# Patient Record
Sex: MALE | Race: WHITE | NOT HISPANIC OR LATINO | URBAN - METROPOLITAN AREA
[De-identification: names, ages, dates, MRNs, and addresses within clinical notes are randomized per-mention and may not be internally consistent; named-entity substitution may affect disease eponyms.]

---

## 2022-01-16 ENCOUNTER — EMERGENCY (EMERGENCY)
Facility: HOSPITAL | Age: 32
LOS: 1 days | Discharge: HOME | End: 2022-01-16
Attending: EMERGENCY MEDICINE | Admitting: EMERGENCY MEDICINE
Payer: MEDICARE

## 2022-01-16 VITALS
TEMPERATURE: 98 F | OXYGEN SATURATION: 96 % | DIASTOLIC BLOOD PRESSURE: 74 MMHG | HEART RATE: 90 BPM | SYSTOLIC BLOOD PRESSURE: 120 MMHG | RESPIRATION RATE: 18 BRPM

## 2022-01-16 VITALS
RESPIRATION RATE: 16 BRPM | HEART RATE: 84 BPM | DIASTOLIC BLOOD PRESSURE: 66 MMHG | SYSTOLIC BLOOD PRESSURE: 103 MMHG | OXYGEN SATURATION: 97 % | TEMPERATURE: 98 F

## 2022-01-16 DIAGNOSIS — U07.1 COVID-19: ICD-10-CM

## 2022-01-16 DIAGNOSIS — R05.9 COUGH, UNSPECIFIED: ICD-10-CM

## 2022-01-16 DIAGNOSIS — F17.200 NICOTINE DEPENDENCE, UNSPECIFIED, UNCOMPLICATED: ICD-10-CM

## 2022-01-16 LAB
ALBUMIN SERPL ELPH-MCNC: 4.7 G/DL — SIGNIFICANT CHANGE UP (ref 3.3–5)
ALP SERPL-CCNC: 136 U/L — HIGH (ref 40–120)
ALT FLD-CCNC: 23 U/L — SIGNIFICANT CHANGE UP (ref 10–45)
ANION GAP SERPL CALC-SCNC: 15 MMOL/L — SIGNIFICANT CHANGE UP (ref 5–17)
AST SERPL-CCNC: 30 U/L — SIGNIFICANT CHANGE UP (ref 10–40)
BASOPHILS # BLD AUTO: 0.04 K/UL — SIGNIFICANT CHANGE UP (ref 0–0.2)
BASOPHILS NFR BLD AUTO: 0.6 % — SIGNIFICANT CHANGE UP (ref 0–2)
BILIRUB SERPL-MCNC: 0.2 MG/DL — SIGNIFICANT CHANGE UP (ref 0.2–1.2)
BUN SERPL-MCNC: 15 MG/DL — SIGNIFICANT CHANGE UP (ref 7–23)
CALCIUM SERPL-MCNC: 8.6 MG/DL — SIGNIFICANT CHANGE UP (ref 8.4–10.5)
CHLORIDE SERPL-SCNC: 103 MMOL/L — SIGNIFICANT CHANGE UP (ref 96–108)
CO2 SERPL-SCNC: 23 MMOL/L — SIGNIFICANT CHANGE UP (ref 22–31)
CREAT SERPL-MCNC: 0.68 MG/DL — SIGNIFICANT CHANGE UP (ref 0.5–1.3)
EOSINOPHIL # BLD AUTO: 0.11 K/UL — SIGNIFICANT CHANGE UP (ref 0–0.5)
EOSINOPHIL NFR BLD AUTO: 1.5 % — SIGNIFICANT CHANGE UP (ref 0–6)
ETHANOL SERPL-MCNC: 242 MG/DL — HIGH (ref 0–10)
GLUCOSE SERPL-MCNC: 85 MG/DL — SIGNIFICANT CHANGE UP (ref 70–99)
HCT VFR BLD CALC: 33.2 % — LOW (ref 39–50)
HGB BLD-MCNC: 11.4 G/DL — LOW (ref 13–17)
IMM GRANULOCYTES NFR BLD AUTO: 0.4 % — SIGNIFICANT CHANGE UP (ref 0–1.5)
LIDOCAIN IGE QN: 25 U/L — SIGNIFICANT CHANGE UP (ref 7–60)
LYMPHOCYTES # BLD AUTO: 2.51 K/UL — SIGNIFICANT CHANGE UP (ref 1–3.3)
LYMPHOCYTES # BLD AUTO: 34.9 % — SIGNIFICANT CHANGE UP (ref 13–44)
MCHC RBC-ENTMCNC: 29.5 PG — SIGNIFICANT CHANGE UP (ref 27–34)
MCHC RBC-ENTMCNC: 34.3 GM/DL — SIGNIFICANT CHANGE UP (ref 32–36)
MCV RBC AUTO: 85.8 FL — SIGNIFICANT CHANGE UP (ref 80–100)
MONOCYTES # BLD AUTO: 0.77 K/UL — SIGNIFICANT CHANGE UP (ref 0–0.9)
MONOCYTES NFR BLD AUTO: 10.7 % — SIGNIFICANT CHANGE UP (ref 2–14)
NEUTROPHILS # BLD AUTO: 3.74 K/UL — SIGNIFICANT CHANGE UP (ref 1.8–7.4)
NEUTROPHILS NFR BLD AUTO: 51.9 % — SIGNIFICANT CHANGE UP (ref 43–77)
NRBC # BLD: 0 /100 WBCS — SIGNIFICANT CHANGE UP (ref 0–0)
PLATELET # BLD AUTO: 398 K/UL — SIGNIFICANT CHANGE UP (ref 150–400)
POTASSIUM SERPL-MCNC: 4 MMOL/L — SIGNIFICANT CHANGE UP (ref 3.5–5.3)
POTASSIUM SERPL-SCNC: 4 MMOL/L — SIGNIFICANT CHANGE UP (ref 3.5–5.3)
PROT SERPL-MCNC: 8 G/DL — SIGNIFICANT CHANGE UP (ref 6–8.3)
RBC # BLD: 3.87 M/UL — LOW (ref 4.2–5.8)
RBC # FLD: 14.8 % — HIGH (ref 10.3–14.5)
SARS-COV-2 RNA SPEC QL NAA+PROBE: NEGATIVE — SIGNIFICANT CHANGE UP
SODIUM SERPL-SCNC: 141 MMOL/L — SIGNIFICANT CHANGE UP (ref 135–145)
WBC # BLD: 7.2 K/UL — SIGNIFICANT CHANGE UP (ref 3.8–10.5)
WBC # FLD AUTO: 7.2 K/UL — SIGNIFICANT CHANGE UP (ref 3.8–10.5)

## 2022-01-16 PROCEDURE — 96374 THER/PROPH/DIAG INJ IV PUSH: CPT

## 2022-01-16 PROCEDURE — 87635 SARS-COV-2 COVID-19 AMP PRB: CPT

## 2022-01-16 PROCEDURE — 71045 X-RAY EXAM CHEST 1 VIEW: CPT | Mod: 26

## 2022-01-16 PROCEDURE — 99053 MED SERV 10PM-8AM 24 HR FAC: CPT

## 2022-01-16 PROCEDURE — 85025 COMPLETE CBC W/AUTO DIFF WBC: CPT

## 2022-01-16 PROCEDURE — 36415 COLL VENOUS BLD VENIPUNCTURE: CPT

## 2022-01-16 PROCEDURE — 96375 TX/PRO/DX INJ NEW DRUG ADDON: CPT

## 2022-01-16 PROCEDURE — 99284 EMERGENCY DEPT VISIT MOD MDM: CPT

## 2022-01-16 PROCEDURE — 99284 EMERGENCY DEPT VISIT MOD MDM: CPT | Mod: 25

## 2022-01-16 PROCEDURE — 83690 ASSAY OF LIPASE: CPT

## 2022-01-16 PROCEDURE — 80307 DRUG TEST PRSMV CHEM ANLYZR: CPT

## 2022-01-16 PROCEDURE — 71045 X-RAY EXAM CHEST 1 VIEW: CPT

## 2022-01-16 PROCEDURE — 80053 COMPREHEN METABOLIC PANEL: CPT

## 2022-01-16 RX ORDER — GABAPENTIN 400 MG/1
0 CAPSULE ORAL
Qty: 0 | Refills: 0 | DISCHARGE

## 2022-01-16 RX ORDER — ESCITALOPRAM OXALATE 10 MG/1
0 TABLET, FILM COATED ORAL
Qty: 0 | Refills: 0 | DISCHARGE

## 2022-01-16 RX ORDER — ONDANSETRON 8 MG/1
4 TABLET, FILM COATED ORAL ONCE
Refills: 0 | Status: COMPLETED | OUTPATIENT
Start: 2022-01-16 | End: 2022-01-16

## 2022-01-16 RX ORDER — FAMOTIDINE 10 MG/ML
20 INJECTION INTRAVENOUS ONCE
Refills: 0 | Status: COMPLETED | OUTPATIENT
Start: 2022-01-16 | End: 2022-01-16

## 2022-01-16 RX ADMIN — FAMOTIDINE 20 MILLIGRAM(S): 10 INJECTION INTRAVENOUS at 01:53

## 2022-01-16 RX ADMIN — ONDANSETRON 4 MILLIGRAM(S): 8 TABLET, FILM COATED ORAL at 01:54

## 2022-01-16 NOTE — ED PROVIDER NOTE - CLINICAL SUMMARY MEDICAL DECISION MAKING FREE TEXT BOX
30 y/o m presents covid + with upper abd pain, nausea; pt afebrile, vss, exam unremarkable.  Will check basic labs, cxr, given IV fluid, zofran/pepcid.  F/u results and reassess.

## 2022-01-16 NOTE — ED PROVIDER NOTE - ATTENDING CONTRIBUTION TO CARE
Pt w/ PMHx TBI, is unvaccinated, reported COVID+, with sx for 7 days which have included cough, body aches, abd pain, vomiting. Pt stating his abdomen has been hurting today although he hasn't vomited in the past day. He states he has since tolerated a pint of alcohol, s/p last vomiting yesterday. Denies SOB, CP, diarrhea, all other ROS negative. He denies drug use.   Limited PE performed in the setting of the COVID10 pandemic, in efforts to limit exposure and cross-contamination  Constitutional: Well appearing, awake, alert, oriented to person, place, time/situation and in no apparent distress.  ENMT: Airway patent.   Eyes: Clear bilaterally  Cardiac: Normal rate, regular rhythm.   Respiratory: No increased WOB, tachypnea, hypoxia, or accessory mm use. Pt speaks in full sentences.   Gastrointestinal: Abd soft, NT, ND. No guarding, rebound, or rigidity. No pulsatile abd masses.   Musculoskeletal: Range of motion is not limited  Neuro: Alert and oriented x 3, face symmetric and speech fluent. Nml gross motor movement, grossly non focal   Skin: Skin normal color for race, warm, dry and intact. No evidence of rash.  Psych: Alert and oriented to person, place, time/situation. flat affect. no apparent risk to self or others.   Pt p/w not feeling well, reports COVID+ from outside facility, c/o abd pain w/o tenderness, vomiting which has ceased, s/p alcohol intake. Clinically hydrated. Benign, non tender abd. No CP, SOB, inc WOB, tachypnea or hypoxia in the setting of COVID19. Suspect intoxicated. Gentle hydration, check labs to eval for electrolyte / metabolic disturbances, pancreatitis (although low suspicion).   Labs w/o sig abnormalities. CXR clear. observed for sobriety. D/c w/ supportive care / iso instructions

## 2022-01-16 NOTE — ED PROVIDER NOTE - OBJECTIVE STATEMENT
30 y/o m presents covid + with sx for 7 days which have included cough, body aches, abd pain, vomiting.  Pt stating his abdomen has been hurting today although he hasn't vomited in the past day.  Denies SOB, CP, diarrhea, all other ROS negative.

## 2022-01-16 NOTE — ED ADULT TRIAGE NOTE - DOMESTIC TRAVEL HIGH RISK QUESTION
Patient has called back stating he is still having lots os swelling and pain in his legs and feet and was wanting to know what he could do.     Please advise   
Patient was notified with verbal understanding, and to let us know if he continued to have any issues and to keep follow up apointment  
Patient would like to know the results of his recent labs   
No

## 2022-01-16 NOTE — ED PROVIDER NOTE - NSFOLLOWUPINSTRUCTIONS_ED_ALL_ED_FT
COVID-19 (Coronavirus Disease 2019)    WHAT YOU NEED TO KNOW:    COVID-19 is the disease caused by a coronavirus first discovered in December 2019. Coronaviruses generally cause upper respiratory (nose, throat, and lung) infections, such as a cold. The 2019 virus spreads quickly and easily. It can be spread starting 2 to 3 days before symptoms even begin.    DISCHARGE INSTRUCTIONS:    Call your local emergency number (911 in the ) if:   •You have trouble breathing or shortness of breath at rest.      •You have chest pain or pressure that lasts longer than 5 minutes.      •You become confused or hard to wake.      •Your lips or face are blue.      Return to the emergency department if:   •You have a fever of 104°F (40°C) or higher.          Call your doctor if:   •You have symptoms of COVID-19.      •You have questions or concerns about your condition or care.      Medicines: You may need any of the following:   •Decongestants help reduce nasal congestion and help you breathe more easily. If you take decongestant pills, they may make you feel restless or cause problems with your sleep. Do not use decongestant sprays for more than a few days.      •Cough suppressants help reduce coughing. Ask your healthcare provider which type of cough medicine is best for you.      •Acetaminophen decreases pain and fever. It is available without a doctor's order. Ask how much to take and how often to take it. Follow directions. Read the labels of all other medicines you are using to see if they also contain acetaminophen, or ask your doctor or pharmacist. Acetaminophen can cause liver damage if not taken correctly. Do not use more than 4 grams (4,000 milligrams) total of acetaminophen in one day.       •NSAIDs, such as ibuprofen, help decrease swelling, pain, and fever. This medicine is available with or without a doctor's order. NSAIDs can cause stomach bleeding or kidney problems in certain people. If you take blood thinner medicine, always ask your healthcare provider if NSAIDs are safe for you. Always read the medicine label and follow directions.      •Blood thinners help prevent blood clots. Clots can cause strokes, heart attacks, and death. The following are general safety guidelines to follow while you are taking a blood thinner:?Watch for bleeding and bruising while you take blood thinners. Watch for bleeding from your gums or nose. Watch for blood in your urine and bowel movements. Use a soft washcloth on your skin, and a soft toothbrush to brush your teeth. This can keep your skin and gums from bleeding. If you shave, use an electric shaver. Do not play contact sports.       ?Tell your dentist and other healthcare providers that you take a blood thinner. Wear a bracelet or necklace that says you take this medicine.       ?Do not start or stop any other medicines unless your healthcare provider tells you to. Many medicines cannot be used with blood thinners.      ?Take your blood thinner exactly as prescribed by your healthcare provider. Do not skip does or take less than prescribed. Tell your provider right away if you forget to take your blood thinner, or if you take too much.      ?Warfarin is a blood thinner that you may need to take. The following are things you should be aware of if you take warfarin: ?Foods and medicines can affect the amount of warfarin in your blood. Do not make major changes to your diet while you take warfarin. Warfarin works best when you eat about the same amount of vitamin K every day. Vitamin K is found in green leafy vegetables and certain other foods. Ask for more information about what to eat when you are taking warfarin.      ?You will need to see your healthcare provider for follow-up visits when you are on warfarin. You will need regular blood tests. These tests are used to decide how much medicine you need.         •Take your medicine as directed. Contact your healthcare provider if you think your medicine is not helping or if you have side effects. Tell him or her if you are allergic to any medicine. Keep a list of the medicines, vitamins, and herbs you take. Include the amounts, and when and why you take them. Bring the list or the pill bottles to follow-up visits. Carry your medicine list with you in case of an emergency.      What you need to know about variants: The virus has changed into several new forms (called variants) since it was discovered. The variants may be more contagious (easily spread) than the original form. Some may also cause more severe illness than others.    What you need to know about COVID-19 vaccines: Healthcare providers recommend a COVID-19 vaccine, even if you have already had COVID-19. You are considered fully vaccinated against COVID-19 two weeks after the final dose of any COVID-19 vaccine. Let your healthcare provider know when you have received the final dose of the vaccine. Make a copy of your vaccination card. Keep the original with you in case you need to show it. Keep the copy in a safe place.  •COVID-19 vaccines are given as a shot in 1 or 2 doses.   Vaccination is recommended for everyone 5 years or older. One 2-dose vaccine is fully approved for those 16 or older. This vaccine also has an emergency use authorization (EUA) for children 5 to 15 years old. No vaccine is currently available for children younger than 5 years. An additional (booster) dose is also recommended for all adults 18 or older. The booster can be a different brand of the COVID-19 vaccine than you originally received. The timing for the booster depends on the type of vaccine you received:?1-dose vaccine: The booster is given at least 2 months after you received the vaccine.      ?2-dose vaccine: The booster is given at least 6 months after the second dose.    COVID-19 Immunization Schedule         •Continue social distancing and other measures, even after you get the vaccine. Although it is not common, you can become infected after you get the vaccine. You may also be able to pass the virus to others without knowing you are infected.      •After you get the vaccine, check local, national, and international travel rules. You may need to be tested before you travel. Some countries require proof of a negative test before you travel. You may also need to quarantine after you return.      How the 2019 coronavirus spreads:   •Droplets are the main way all coronaviruses spread. The virus travels in droplets that form when a person talks, sings, coughs, or sneezes. The droplets can also float in the air for minutes or hours. Infection happens when you breathe in the droplets or get them in your eyes or nose. Close personal contact with an infected person increases your risk for infection. This means being within 6 feet (2 meters) of the person for at least 15 minutes over 24 hours.      •Person-to-person contact can spread the virus. For example, a person with the virus on his or her hands can spread it by shaking hands with someone.      •The virus can stay on objects and surfaces for up to 3 days. You may become infected by touching the object or surface and then touching your eyes or mouth.      Help lower the risk for COVID-19:   •Wash your hands often throughout the day. Use soap and water. Rub your soapy hands together, lacing your fingers, for at least 20 seconds. Rinse with warm, running water. Dry your hands with a clean towel or paper towel. Use hand  that contains alcohol if soap and water are not available. Teach children how to wash their hands and use hand .  Handwashing           •Cover sneezes and coughs. Turn your face away and cover your mouth and nose with a tissue. Throw the tissue away. Use the bend of your arm if a tissue is not available. Then wash your hands well with soap and water or use hand . Teach children how to cover a cough or sneeze.      •Wear a face covering (mask) when needed. Use a cloth covering with at least 2 layers. You can also create layers by putting a cloth covering over a disposable non-medical mask. Cover your mouth and your nose.  How to Wear a Face Covering (Mask)           •Follow worldwide, national, and local social distancing guidelines. Keep at least 6 feet (2 meters) between you and others.      •Try not to touch your face. If you get the virus on your hands, you can transfer it to your eyes, nose, or mouth and become infected. You can also transfer it to objects, surfaces, or people.      •Clean and disinfect high-touch surfaces and objects often. Use disinfecting wipes, or make a solution of 4 teaspoons of bleach in 1 quart (4 cups) of water.      •Ask about other vaccines you may need. Get the influenza (flu) vaccine as soon as recommended each year, usually starting in September or October. Get the pneumonia vaccine if recommended. Your healthcare provider can tell you if you should also get other vaccines, and when to get them.    Prevent COVID-19 Infection         Follow social distancing guidelines: National and local social distancing rules vary. Rules and restrictions may change over time as restrictions are lifted. The following are general guidelines:   •Stay home if you are sick or think you may have COVID-19. It is important to stay home if you are waiting for a testing appointment or for test results.      •Avoid close physical contact with anyone who does not live in your home. Do not shake hands with, hug, or kiss a person as a greeting. If you must use public transportation (such as a bus or subway), try to sit or stand away from others. Wear your face covering.      •Avoid in-person gatherings and crowds. Attend virtually if possible.      Follow up with your doctor as directed: Write down your questions so you remember to ask them during your visits.    For more information:   •Centers for Disease Control and Prevention  [address] [place] [address]  Phone: 1-748.119.1967  Web Address: http://www.cdc.gov

## 2022-01-16 NOTE — ED ADULT NURSE NOTE - OBJECTIVE STATEMENT
Pt presented to the ED with complaints of abdominal pain. As per pt, he has been feeling unwell for the past week, states that he tested positive for covid 2 days ago. Pt stated he has been having abdominal pain, nausea, vomiting, and cough.

## 2022-01-16 NOTE — ED ADULT NURSE NOTE - NSIMPLEMENTINTERV_GEN_ALL_ED
Implemented All Universal Safety Interventions:  [place] to call system. Call bell, personal items and telephone within reach. Instruct patient to call for assistance. Room bathroom lighting operational. Non-slip footwear when patient is off stretcher. Physically safe environment: no spills, clutter or unnecessary equipment. Stretcher in lowest position, wheels locked, appropriate side rails in place.

## 2022-01-16 NOTE — ED PROVIDER NOTE - PATIENT PORTAL LINK FT
You can access the FollowMyHealth Patient Portal offered by [facility] by registering at the following website: [email or web address]. By joining [employer]’s FollowMyHealth portal, you will also be able to view your health information using other applications (apps) compatible with our system.

## 2023-01-02 ENCOUNTER — EMERGENCY (EMERGENCY)
Facility: HOSPITAL | Age: 33
LOS: 1 days | Discharge: ANOTHER HOSPITAL | End: 2023-01-02
Attending: EMERGENCY MEDICINE | Admitting: EMERGENCY MEDICINE
Payer: MEDICAID

## 2023-01-02 VITALS
SYSTOLIC BLOOD PRESSURE: 138 MMHG | OXYGEN SATURATION: 94 % | HEIGHT: 67 IN | RESPIRATION RATE: 18 BRPM | HEART RATE: 121 BPM | DIASTOLIC BLOOD PRESSURE: 84 MMHG | WEIGHT: 130.07 LBS | TEMPERATURE: 98 F

## 2023-01-02 LAB
ALBUMIN SERPL ELPH-MCNC: 4.1 G/DL — SIGNIFICANT CHANGE UP (ref 3.4–5)
ALP SERPL-CCNC: 146 U/L — HIGH (ref 40–120)
ALT FLD-CCNC: 201 U/L — HIGH (ref 12–42)
ANION GAP SERPL CALC-SCNC: 14 MMOL/L — SIGNIFICANT CHANGE UP (ref 9–16)
AST SERPL-CCNC: 244 U/L — HIGH (ref 15–37)
BASOPHILS # BLD AUTO: 0.04 K/UL — SIGNIFICANT CHANGE UP (ref 0–0.2)
BASOPHILS NFR BLD AUTO: 0.9 % — SIGNIFICANT CHANGE UP (ref 0–2)
BILIRUB SERPL-MCNC: 0.4 MG/DL — SIGNIFICANT CHANGE UP (ref 0.2–1.2)
BUN SERPL-MCNC: 7 MG/DL — SIGNIFICANT CHANGE UP (ref 7–23)
CALCIUM SERPL-MCNC: 9.8 MG/DL — SIGNIFICANT CHANGE UP (ref 8.5–10.5)
CHLORIDE SERPL-SCNC: 98 MMOL/L — SIGNIFICANT CHANGE UP (ref 96–108)
CO2 SERPL-SCNC: 26 MMOL/L — SIGNIFICANT CHANGE UP (ref 22–31)
CREAT SERPL-MCNC: 0.76 MG/DL — SIGNIFICANT CHANGE UP (ref 0.5–1.3)
EGFR: 121 ML/MIN/1.73M2 — SIGNIFICANT CHANGE UP
EOSINOPHIL # BLD AUTO: 0.02 K/UL — SIGNIFICANT CHANGE UP (ref 0–0.5)
EOSINOPHIL NFR BLD AUTO: 0.5 % — SIGNIFICANT CHANGE UP (ref 0–6)
ETHANOL SERPL-MCNC: 264 MG/DL — HIGH
GLUCOSE SERPL-MCNC: 85 MG/DL — SIGNIFICANT CHANGE UP (ref 70–99)
HCT VFR BLD CALC: 40.6 % — SIGNIFICANT CHANGE UP (ref 39–50)
HGB BLD-MCNC: 14.1 G/DL — SIGNIFICANT CHANGE UP (ref 13–17)
IMM GRANULOCYTES NFR BLD AUTO: 0.5 % — SIGNIFICANT CHANGE UP (ref 0–0.9)
LYMPHOCYTES # BLD AUTO: 0.8 K/UL — LOW (ref 1–3.3)
LYMPHOCYTES # BLD AUTO: 18.9 % — SIGNIFICANT CHANGE UP (ref 13–44)
MCHC RBC-ENTMCNC: 29.9 PG — SIGNIFICANT CHANGE UP (ref 27–34)
MCHC RBC-ENTMCNC: 34.7 GM/DL — SIGNIFICANT CHANGE UP (ref 32–36)
MCV RBC AUTO: 86 FL — SIGNIFICANT CHANGE UP (ref 80–100)
MONOCYTES # BLD AUTO: 0.22 K/UL — SIGNIFICANT CHANGE UP (ref 0–0.9)
MONOCYTES NFR BLD AUTO: 5.2 % — SIGNIFICANT CHANGE UP (ref 2–14)
NEUTROPHILS # BLD AUTO: 3.14 K/UL — SIGNIFICANT CHANGE UP (ref 1.8–7.4)
NEUTROPHILS NFR BLD AUTO: 74 % — SIGNIFICANT CHANGE UP (ref 43–77)
NRBC # BLD: 0 /100 WBCS — SIGNIFICANT CHANGE UP (ref 0–0)
PLATELET # BLD AUTO: 140 K/UL — LOW (ref 150–400)
POTASSIUM SERPL-MCNC: 3.3 MMOL/L — LOW (ref 3.5–5.3)
POTASSIUM SERPL-SCNC: 3.3 MMOL/L — LOW (ref 3.5–5.3)
PROT SERPL-MCNC: 8.1 G/DL — SIGNIFICANT CHANGE UP (ref 6.4–8.2)
RBC # BLD: 4.72 M/UL — SIGNIFICANT CHANGE UP (ref 4.2–5.8)
RBC # FLD: 14.2 % — SIGNIFICANT CHANGE UP (ref 10.3–14.5)
SODIUM SERPL-SCNC: 138 MMOL/L — SIGNIFICANT CHANGE UP (ref 132–145)
WBC # BLD: 4.24 K/UL — SIGNIFICANT CHANGE UP (ref 3.8–10.5)
WBC # FLD AUTO: 4.24 K/UL — SIGNIFICANT CHANGE UP (ref 3.8–10.5)

## 2023-01-02 PROCEDURE — 99285 EMERGENCY DEPT VISIT HI MDM: CPT

## 2023-01-02 PROCEDURE — 70486 CT MAXILLOFACIAL W/O DYE: CPT | Mod: 26

## 2023-01-02 PROCEDURE — 70450 CT HEAD/BRAIN W/O DYE: CPT | Mod: 26

## 2023-01-02 RX ORDER — SODIUM CHLORIDE 9 MG/ML
1000 INJECTION INTRAMUSCULAR; INTRAVENOUS; SUBCUTANEOUS ONCE
Refills: 0 | Status: COMPLETED | OUTPATIENT
Start: 2023-01-02 | End: 2023-01-02

## 2023-01-02 RX ORDER — ACETAMINOPHEN 500 MG
975 TABLET ORAL ONCE
Refills: 0 | Status: COMPLETED | OUTPATIENT
Start: 2023-01-02 | End: 2023-01-02

## 2023-01-02 RX ADMIN — SODIUM CHLORIDE 1000 MILLILITER(S): 9 INJECTION INTRAMUSCULAR; INTRAVENOUS; SUBCUTANEOUS at 22:16

## 2023-01-02 RX ADMIN — Medication 975 MILLIGRAM(S): at 23:01

## 2023-01-02 RX ADMIN — SODIUM CHLORIDE 1000 MILLILITER(S): 9 INJECTION INTRAMUSCULAR; INTRAVENOUS; SUBCUTANEOUS at 23:01

## 2023-01-02 NOTE — ED PROVIDER NOTE - PHYSICAL EXAMINATION
VSS in NAD (+) alcohol intox   (+) R facial swelling, dried/healing facial abrasions, (+) Swelling over R maxillary sinus (+) R subconjunctival hemorrhage EOMI PERRL OP clear  heart RRR no murmur   lungs CTA no wheezing no rales no rhonchi   L lateral chest wall with linear abrasions, no chest wall tenderness   abd soft NT ND no CVAT no guarding no rebound   normal neuro exam CN I-XII grossly intact, no groos motor or sensory deficits  no peripheral c/c/e  moving all extremities no obvious extremity trauma

## 2023-01-02 NOTE — ED PROVIDER NOTE - CARE PLAN
Principal Discharge DX:	Intraparenchymal hemorrhage of brain  Secondary Diagnosis:	Subdural hemorrhage  Secondary Diagnosis:	Nasal bone fracture  Secondary Diagnosis:	Alcohol intoxication   1
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
## 2023-01-02 NOTE — ED ADULT TRIAGE NOTE - AS TEMP SITE
Follow-up Note  CC:   HISTORY OF PRESENT ILLNESS:  Byron Floyd is a 44year old old male, originally referred to the pain clinic by Dr. Thu Gonzalez, for lower back pain and left leg pain status post a left-sided L4-5 transforaminal epidural steroid injecti oral pain.  Numbness/tingling: as above  Weakness: as above  Weight Loss: Negative   Fever: Negative   Cardiovascular:  No current chest pain or palpitations   Respiratory:  No current shortness of breath   Gastrointestinal:  No active ulcer  Genitourinary:  Ne activity: Not on file    Lifestyle      Physical activity:        Days per week: Not on file        Minutes per session: Not on file      Stress: Not on file    Relationships      Social connections:        Talks on phone: Not on file        Gets together: loading in the lumbar spine    MOTOR EXAMINATION:  LOWER EXTREMITY      LEFT RIGHT   Iliopsoas 5/5 5/5   Quadriceps 5/5 5/5   Foot DF 5/5 5/5   Foot EHL 5/5 5/5   Gastrocnemius 5/5 5/5   Edema - Absent  Skin - normal   Sensation-intact symmetric to light t =====  CONCLUSION:      1. L4-L5 left foraminal zone disc protrusion, which results in moderate to severe left neural foraminal stenosis and effacement of the exiting left L4 nerve root. Please correlate for left L4 radiculopathy.   2. L3-L4:  Mild left inflammatories, muscle relaxants, neuropathic medications, oral steroids, analgesics), injections, and further testing. Risks and benefits of all options were discussed at length to patients satisfaction during a comprehensive interactive discussion.  All q
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
## 2023-01-03 VITALS
SYSTOLIC BLOOD PRESSURE: 109 MMHG | DIASTOLIC BLOOD PRESSURE: 62 MMHG | TEMPERATURE: 97 F | RESPIRATION RATE: 18 BRPM | OXYGEN SATURATION: 99 % | HEART RATE: 98 BPM

## 2023-01-03 LAB — SARS-COV-2 RNA SPEC QL NAA+PROBE: SIGNIFICANT CHANGE UP

## 2023-01-03 NOTE — ED ADULT NURSE REASSESSMENT NOTE - GENERAL PATIENT STATE
Detail Level: Detailed
Add 98953 Cpt? (Important Note: In 2017 The Use Of 00965 Is Being Tracked By Cms To Determine Future Global Period Reimbursement For Global Periods): no
resting/sleeping

## 2023-01-03 NOTE — ED ADULT NURSE REASSESSMENT NOTE - NS ED NURSE REASSESS COMMENT FT1
Patient pulled his IV out, states that it is "unnecessary." Patient made aware that he is awaiting CTr.
Patient continues to be resting on the stretcher, placed another PIV, made aware of the need for IV and instructed not to pull it out again, verbalized understanding. Patient's vitals as charted. Denies acute pain, denies nausea/vomiting.
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
## 2023-01-05 DIAGNOSIS — S02.2XXA FRACTURE OF NASAL BONES, INITIAL ENCOUNTER FOR CLOSED FRACTURE: ICD-10-CM

## 2023-01-05 DIAGNOSIS — S06.360A TRAUMATIC HEMORRHAGE OF CEREBRUM, UNSPECIFIED, WITHOUT LOSS OF CONSCIOUSNESS, INITIAL ENCOUNTER: ICD-10-CM

## 2023-01-05 DIAGNOSIS — S09.90XA UNSPECIFIED INJURY OF HEAD, INITIAL ENCOUNTER: ICD-10-CM
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
## 2023-01-05 DIAGNOSIS — S06.5X0A TRAUMATIC SUBDURAL HEMORRHAGE WITHOUT LOSS OF CONSCIOUSNESS, INITIAL ENCOUNTER: ICD-10-CM

## 2023-01-05 DIAGNOSIS — F10.129 ALCOHOL ABUSE WITH INTOXICATION, UNSPECIFIED: ICD-10-CM

## 2023-04-09 ENCOUNTER — EMERGENCY (EMERGENCY)
Facility: HOSPITAL | Age: 33
LOS: 1 days | Discharge: HOME | End: 2023-04-09
Attending: EMERGENCY MEDICINE | Admitting: EMERGENCY MEDICINE
Payer: MEDICAID

## 2023-04-09 VITALS
OXYGEN SATURATION: 99 % | DIASTOLIC BLOOD PRESSURE: 92 MMHG | HEART RATE: 83 BPM | RESPIRATION RATE: 16 BRPM | TEMPERATURE: 97 F | SYSTOLIC BLOOD PRESSURE: 129 MMHG

## 2023-04-09 LAB
ALBUMIN SERPL ELPH-MCNC: 4.9 G/DL — SIGNIFICANT CHANGE UP (ref 3.3–5)
ALP SERPL-CCNC: 133 U/L — HIGH (ref 40–120)
ALT FLD-CCNC: 60 U/L — HIGH (ref 4–41)
AMPHET UR-MCNC: NEGATIVE — SIGNIFICANT CHANGE UP
ANION GAP SERPL CALC-SCNC: 19 MMOL/L — HIGH (ref 7–14)
APAP SERPL-MCNC: <10 UG/ML — LOW (ref 15–25)
APPEARANCE UR: CLEAR — SIGNIFICANT CHANGE UP
AST SERPL-CCNC: 148 U/L — HIGH (ref 4–40)
BACTERIA # UR AUTO: NEGATIVE — SIGNIFICANT CHANGE UP
BARBITURATES UR SCN-MCNC: NEGATIVE — SIGNIFICANT CHANGE UP
BASE EXCESS BLDV CALC-SCNC: 2.9 MMOL/L — SIGNIFICANT CHANGE UP (ref -2–3)
BASE EXCESS BLDV CALC-SCNC: 4.6 MMOL/L — HIGH (ref -2–3)
BASOPHILS # BLD AUTO: 0.1 K/UL — SIGNIFICANT CHANGE UP (ref 0–0.2)
BASOPHILS NFR BLD AUTO: 3.5 % — HIGH (ref 0–2)
BENZODIAZ UR-MCNC: POSITIVE
BILIRUB SERPL-MCNC: 0.3 MG/DL — SIGNIFICANT CHANGE UP (ref 0.2–1.2)
BILIRUB UR-MCNC: NEGATIVE — SIGNIFICANT CHANGE UP
BLOOD GAS VENOUS COMPREHENSIVE RESULT: SIGNIFICANT CHANGE UP
BUN SERPL-MCNC: 5 MG/DL — LOW (ref 7–23)
CALCIUM SERPL-MCNC: 9 MG/DL — SIGNIFICANT CHANGE UP (ref 8.4–10.5)
CHLORIDE BLDV-SCNC: 106 MMOL/L — SIGNIFICANT CHANGE UP (ref 96–108)
CHLORIDE BLDV-SCNC: 108 MMOL/L — SIGNIFICANT CHANGE UP (ref 96–108)
CHLORIDE SERPL-SCNC: 102 MMOL/L — SIGNIFICANT CHANGE UP (ref 98–107)
CO2 BLDV-SCNC: 30.4 MMOL/L — HIGH (ref 22–26)
CO2 BLDV-SCNC: 32.9 MMOL/L — HIGH (ref 22–26)
CO2 SERPL-SCNC: 24 MMOL/L — SIGNIFICANT CHANGE UP (ref 22–31)
COCAINE METAB.OTHER UR-MCNC: NEGATIVE — SIGNIFICANT CHANGE UP
COLOR SPEC: COLORLESS — SIGNIFICANT CHANGE UP
CREAT SERPL-MCNC: 0.55 MG/DL — SIGNIFICANT CHANGE UP (ref 0.5–1.3)
CREATININE URINE RESULT, DAU: 16 MG/DL — SIGNIFICANT CHANGE UP
DIFF PNL FLD: NEGATIVE — SIGNIFICANT CHANGE UP
EGFR: 133 ML/MIN/1.73M2 — SIGNIFICANT CHANGE UP
EOSINOPHIL # BLD AUTO: 0.03 K/UL — SIGNIFICANT CHANGE UP (ref 0–0.5)
EOSINOPHIL NFR BLD AUTO: 0.9 % — SIGNIFICANT CHANGE UP (ref 0–6)
EPI CELLS # UR: 0 /HPF — SIGNIFICANT CHANGE UP (ref 0–5)
ETHANOL SERPL-MCNC: 501 MG/DL — HIGH
GAS PNL BLDV: 144 MMOL/L — SIGNIFICANT CHANGE UP (ref 136–145)
GAS PNL BLDV: 147 MMOL/L — HIGH (ref 136–145)
GIANT PLATELETS BLD QL SMEAR: PRESENT — SIGNIFICANT CHANGE UP
GLUCOSE BLDV-MCNC: 87 MG/DL — SIGNIFICANT CHANGE UP (ref 70–99)
GLUCOSE BLDV-MCNC: 99 MG/DL — SIGNIFICANT CHANGE UP (ref 70–99)
GLUCOSE SERPL-MCNC: 105 MG/DL — HIGH (ref 70–99)
GLUCOSE UR QL: NEGATIVE — SIGNIFICANT CHANGE UP
HCO3 BLDV-SCNC: 29 MMOL/L — SIGNIFICANT CHANGE UP (ref 22–29)
HCO3 BLDV-SCNC: 31 MMOL/L — HIGH (ref 22–29)
HCT VFR BLD CALC: 37.3 % — LOW (ref 39–50)
HCT VFR BLDA CALC: 33 % — LOW (ref 39–51)
HCT VFR BLDA CALC: 40 % — SIGNIFICANT CHANGE UP (ref 39–51)
HGB BLD CALC-MCNC: 11 G/DL — LOW (ref 12.6–17.4)
HGB BLD CALC-MCNC: 13.2 G/DL — SIGNIFICANT CHANGE UP (ref 12.6–17.4)
HGB BLD-MCNC: 12.5 G/DL — LOW (ref 13–17)
HYALINE CASTS # UR AUTO: 0 /LPF — SIGNIFICANT CHANGE UP (ref 0–7)
HYPOCHROMIA BLD QL: SLIGHT — SIGNIFICANT CHANGE UP
IANC: 1.73 K/UL — LOW (ref 1.8–7.4)
KETONES UR-MCNC: NEGATIVE — SIGNIFICANT CHANGE UP
LACTATE BLDV-MCNC: 3 MMOL/L — HIGH (ref 0.5–2)
LACTATE BLDV-MCNC: 3.6 MMOL/L — HIGH (ref 0.5–2)
LEUKOCYTE ESTERASE UR-ACNC: NEGATIVE — SIGNIFICANT CHANGE UP
LYMPHOCYTES # BLD AUTO: 0.88 K/UL — LOW (ref 1–3.3)
LYMPHOCYTES # BLD AUTO: 29.6 % — SIGNIFICANT CHANGE UP (ref 13–44)
MAGNESIUM SERPL-MCNC: 2.4 MG/DL — SIGNIFICANT CHANGE UP (ref 1.6–2.6)
MCHC RBC-ENTMCNC: 30.3 PG — SIGNIFICANT CHANGE UP (ref 27–34)
MCHC RBC-ENTMCNC: 33.5 GM/DL — SIGNIFICANT CHANGE UP (ref 32–36)
MCV RBC AUTO: 90.3 FL — SIGNIFICANT CHANGE UP (ref 80–100)
METHADONE UR-MCNC: NEGATIVE — SIGNIFICANT CHANGE UP
MONOCYTES # BLD AUTO: 0.1 K/UL — SIGNIFICANT CHANGE UP (ref 0–0.9)
MONOCYTES NFR BLD AUTO: 3.5 % — SIGNIFICANT CHANGE UP (ref 2–14)
NEUTROPHILS # BLD AUTO: 1.73 K/UL — LOW (ref 1.8–7.4)
NEUTROPHILS NFR BLD AUTO: 58.2 % — SIGNIFICANT CHANGE UP (ref 43–77)
NITRITE UR-MCNC: NEGATIVE — SIGNIFICANT CHANGE UP
NRBC # BLD: 1 /100 — HIGH (ref 0–0)
OPIATES UR-MCNC: NEGATIVE — SIGNIFICANT CHANGE UP
OXYCODONE UR-MCNC: NEGATIVE — SIGNIFICANT CHANGE UP
PCO2 BLDV: 50 MMHG — SIGNIFICANT CHANGE UP (ref 42–55)
PCO2 BLDV: 54 MMHG — SIGNIFICANT CHANGE UP (ref 42–55)
PCP SPEC-MCNC: SIGNIFICANT CHANGE UP
PCP UR-MCNC: NEGATIVE — SIGNIFICANT CHANGE UP
PH BLDV: 7.37 — SIGNIFICANT CHANGE UP (ref 7.32–7.43)
PH UR: 6.5 — SIGNIFICANT CHANGE UP (ref 5–8)
PLAT MORPH BLD: NORMAL — SIGNIFICANT CHANGE UP
PLATELET # BLD AUTO: 142 K/UL — LOW (ref 150–400)
PLATELET COUNT - ESTIMATE: ABNORMAL
PO2 BLDV: 43 MMHG — SIGNIFICANT CHANGE UP (ref 25–45)
PO2 BLDV: 60 MMHG — HIGH (ref 25–45)
POIKILOCYTOSIS BLD QL AUTO: SLIGHT — SIGNIFICANT CHANGE UP
POTASSIUM BLDV-SCNC: 3.4 MMOL/L — LOW (ref 3.5–5.1)
POTASSIUM BLDV-SCNC: 4.9 MMOL/L — SIGNIFICANT CHANGE UP (ref 3.5–5.1)
POTASSIUM SERPL-MCNC: 3.7 MMOL/L — SIGNIFICANT CHANGE UP (ref 3.5–5.3)
POTASSIUM SERPL-SCNC: 3.7 MMOL/L — SIGNIFICANT CHANGE UP (ref 3.5–5.3)
PROT SERPL-MCNC: 7.4 G/DL — SIGNIFICANT CHANGE UP (ref 6–8.3)
PROT UR-MCNC: ABNORMAL
RBC # BLD: 4.13 M/UL — LOW (ref 4.2–5.8)
RBC # FLD: 15.9 % — HIGH (ref 10.3–14.5)
RBC BLD AUTO: ABNORMAL
RBC CASTS # UR COMP ASSIST: 0 /HPF — SIGNIFICANT CHANGE UP (ref 0–4)
SALICYLATES SERPL-MCNC: <0.3 MG/DL — LOW (ref 15–30)
SAO2 % BLDV: 65.7 % — LOW (ref 67–88)
SAO2 % BLDV: 87.1 % — SIGNIFICANT CHANGE UP (ref 67–88)
SODIUM SERPL-SCNC: 145 MMOL/L — SIGNIFICANT CHANGE UP (ref 135–145)
SP GR SPEC: 1.01 — LOW (ref 1.01–1.05)
THC UR QL: NEGATIVE — SIGNIFICANT CHANGE UP
TSH SERPL-MCNC: 0.92 UIU/ML — SIGNIFICANT CHANGE UP (ref 0.27–4.2)
UROBILINOGEN FLD QL: SIGNIFICANT CHANGE UP
VARIANT LYMPHS # BLD: 4.3 % — SIGNIFICANT CHANGE UP (ref 0–6)
WBC # BLD: 2.98 K/UL — LOW (ref 3.8–10.5)
WBC # FLD AUTO: 2.98 K/UL — LOW (ref 3.8–10.5)
WBC UR QL: 0 /HPF — SIGNIFICANT CHANGE UP (ref 0–5)

## 2023-04-09 PROCEDURE — 99284 EMERGENCY DEPT VISIT MOD MDM: CPT

## 2023-04-09 RX ORDER — SODIUM CHLORIDE 9 MG/ML
1000 INJECTION, SOLUTION INTRAVENOUS ONCE
Refills: 0 | Status: COMPLETED | OUTPATIENT
Start: 2023-04-09 | End: 2023-04-09

## 2023-04-09 RX ADMIN — SODIUM CHLORIDE 1000 MILLILITER(S): 9 INJECTION, SOLUTION INTRAVENOUS at 22:27
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
## 2023-04-09 NOTE — ED PROVIDER NOTE - PHYSICAL EXAMINATION
General: appears chronically ill, somnolent   Skin: no rash, no pallor, multiple abrasions on hands, knees, no laceration   Head: normocephalic, old appearing ecchymosis surrounding L eye, abrasion to R temporal region   Eyes: clear conjunctiva, PERRL, pupils 6 mm b/l  ENMT: airway patent, no nasal discharge  Cardiovascular: normal rate, normal rhythm, S1/S2  Pulmonary: clear to auscultation bilaterally, no rales, rhonchi, or wheeze  Abdomen: soft, nontender, non distended   Musculoskeletal: moving extremities well, no deformity  Psych: arousable to sternal rubs, unable to provide additional hx, appears intoxicated

## 2023-04-09 NOTE — ED PROVIDER NOTE - PATIENT PORTAL LINK FT
You can access the FollowMyHealth Patient Portal offered by [facility] by registering at the following website: [email or web address]. By joining [employer]’s FollowMyHealth portal, you will also be able to view your health information using other applications (apps) compatible with our system. You can access the FollowMyHealth Patient Portal offered by [facility] by registering at the following website: [email or web address]. By joining [employer]’s FollowMyHealth portal, you will also be able to view your health information using other applications (apps) compatible with our system. You can access the FollowMyHealth Patient Portal offered by [facility] by registering at the following website: [email or web address]. By joining [employer]’s FollowMyHealth portal, you will also be able to view your health information using other applications (apps) compatible with our system.

## 2023-04-09 NOTE — ED PROVIDER NOTE - PROGRESS NOTE DETAILS
Matteo Nielsen, PGY-3- patient now more awake, sitting up in stretcher. lactate elevated and pt with leukopenia. will send hiv test and give ivf/repeat lactate. Matteo Nielsen, PGY-3- patient not clinically sober. tolerated PO. Will dc home. SW to provide pt with metrocard. Ambulatory with steady gait. Discussed results of work up with patient. Patient agrees with plan to discharge home. All questions answered regarding plan. Strict return precautions given. Repeat lactate downtrending, non specific elevation, unlikely to have clinical significance
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
## 2023-04-09 NOTE — ED PROVIDER NOTE - DIFFERENTIAL DIAGNOSIS
Differential Diagnosis DDx includes but is not limited to-   substance use, dehydration, ich, trauma
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
## 2023-04-09 NOTE — ED ADULT NURSE NOTE - NSIMPLEMENTINTERV_GEN_ALL_ED
Implemented All Fall Risk Interventions:  [place] to call system. Call bell, personal items and telephone within reach. Instruct patient to call for assistance. Room bathroom lighting operational. Non-slip footwear when patient is off stretcher. Physically safe environment: no spills, clutter or unnecessary equipment. Stretcher in lowest position, wheels locked, appropriate side rails in place. Provide visual cue, wrist band, yellow gown, etc. Monitor gait and stability. Monitor for mental status changes and reorient to person, place, and time. Review medications for side effects contributing to fall risk. Reinforce activity limits and safety measures with patient and family. Implemented All Fall Risk Interventions:  [place] to call system. Call bell, personal items and telephone within reach. Instruct patient to call for assistance. Room bathroom lighting operational. Non-slip footwear when patient is off stretcher. Physically safe environment: no spills, clutter or unnecessary equipment. Stretcher in lowest position, wheels locked, appropriate side rails in place. Provide visual cue, wrist band, yellow gown, etc. Monitor gait and stability. Monitor for mental status changes and reorient to person, place, and time. Review medications for side effects contributing to fall risk. Reinforce activity limits and safety measures with patient and family. Implemented All Fall Risk Interventions:  [place] to call system. Call bell, personal items and telephone within reach. Instruct patient to call for assistance. Room bathroom lighting operational. Non-slip footwear when patient is off stretcher. Physically safe environment: no spills, clutter or unnecessary equipment. Stretcher in lowest position, wheels locked, appropriate side rails in place. Provide visual cue, wrist band, yellow gown, etc. Monitor gait and stability. Monitor for mental status changes and reorient to person, place, and time. Review medications for side effects contributing to fall risk. Reinforce activity limits and safety measures with patient and family.

## 2023-04-10 ENCOUNTER — INPATIENT (INPATIENT)
Facility: HOSPITAL | Age: 33
LOS: 7 days | Discharge: HOME | DRG: 897 | End: 2023-04-18
Attending: INTERNAL MEDICINE | Admitting: INTERNAL MEDICINE
Payer: MEDICAID

## 2023-04-10 VITALS
SYSTOLIC BLOOD PRESSURE: 119 MMHG | TEMPERATURE: 98 F | RESPIRATION RATE: 16 BRPM | HEART RATE: 102 BPM | OXYGEN SATURATION: 98 % | WEIGHT: 195.11 LBS | DIASTOLIC BLOOD PRESSURE: 84 MMHG

## 2023-04-10 VITALS
TEMPERATURE: 98 F | HEART RATE: 101 BPM | RESPIRATION RATE: 16 BRPM | SYSTOLIC BLOOD PRESSURE: 121 MMHG | OXYGEN SATURATION: 100 % | DIASTOLIC BLOOD PRESSURE: 84 MMHG

## 2023-04-10 DIAGNOSIS — F10.939 ALCOHOL USE, UNSPECIFIED WITH WITHDRAWAL, UNSPECIFIED: ICD-10-CM

## 2023-04-10 LAB
ALBUMIN SERPL ELPH-MCNC: 4.2 G/DL — SIGNIFICANT CHANGE UP (ref 3.3–5)
ALP SERPL-CCNC: 111 U/L — SIGNIFICANT CHANGE UP (ref 40–120)
ALT FLD-CCNC: 54 U/L — HIGH (ref 10–45)
AMPHET UR-MCNC: NEGATIVE — SIGNIFICANT CHANGE UP
ANION GAP SERPL CALC-SCNC: 9 MMOL/L — SIGNIFICANT CHANGE UP (ref 5–17)
APPEARANCE UR: CLEAR — SIGNIFICANT CHANGE UP
APTT BLD: 29.1 SEC — SIGNIFICANT CHANGE UP (ref 27.5–35.5)
AST SERPL-CCNC: 115 U/L — HIGH (ref 10–40)
B-OH-BUTYR SERPL-SCNC: 0.1 MMOL/L — SIGNIFICANT CHANGE UP
BARBITURATES UR SCN-MCNC: NEGATIVE — SIGNIFICANT CHANGE UP
BASE EXCESS BLDV CALC-SCNC: 4.2 MMOL/L — HIGH (ref -2–3)
BASOPHILS # BLD AUTO: 0 K/UL — SIGNIFICANT CHANGE UP (ref 0–0.2)
BASOPHILS NFR BLD AUTO: 0 % — SIGNIFICANT CHANGE UP (ref 0–2)
BENZODIAZ UR-MCNC: POSITIVE
BILIRUB SERPL-MCNC: 0.2 MG/DL — SIGNIFICANT CHANGE UP (ref 0.2–1.2)
BILIRUB UR-MCNC: NEGATIVE — SIGNIFICANT CHANGE UP
BUN SERPL-MCNC: 5 MG/DL — LOW (ref 7–23)
CA-I SERPL-SCNC: 1.08 MMOL/L — LOW (ref 1.15–1.33)
CALCIUM SERPL-MCNC: 8.1 MG/DL — LOW (ref 8.4–10.5)
CHLORIDE BLDV-SCNC: 108 MMOL/L — SIGNIFICANT CHANGE UP (ref 96–108)
CHLORIDE SERPL-SCNC: 105 MMOL/L — SIGNIFICANT CHANGE UP (ref 96–108)
CK SERPL-CCNC: 187 U/L — SIGNIFICANT CHANGE UP (ref 30–200)
CO2 BLDV-SCNC: 32 MMOL/L — HIGH (ref 22–26)
CO2 SERPL-SCNC: 32 MMOL/L — HIGH (ref 22–31)
COCAINE METAB.OTHER UR-MCNC: NEGATIVE — SIGNIFICANT CHANGE UP
COLOR SPEC: SIGNIFICANT CHANGE UP
CREAT SERPL-MCNC: 0.52 MG/DL — SIGNIFICANT CHANGE UP (ref 0.5–1.3)
CULTURE RESULTS: NO GROWTH — SIGNIFICANT CHANGE UP
DIFF PNL FLD: NEGATIVE — SIGNIFICANT CHANGE UP
EGFR: 136 ML/MIN/1.73M2 — SIGNIFICANT CHANGE UP
EOSINOPHIL # BLD AUTO: 0 K/UL — SIGNIFICANT CHANGE UP (ref 0–0.5)
EOSINOPHIL NFR BLD AUTO: 0 % — SIGNIFICANT CHANGE UP (ref 0–6)
ETHANOL SERPL-MCNC: 376 MG/DL — HIGH (ref 0–10)
FLUAV AG NPH QL: SIGNIFICANT CHANGE UP
FLUBV AG NPH QL: SIGNIFICANT CHANGE UP
GAS PNL BLDV: 148 MMOL/L — HIGH (ref 136–145)
GAS PNL BLDV: SIGNIFICANT CHANGE UP
GLUCOSE BLDV-MCNC: 114 MG/DL — HIGH (ref 70–99)
GLUCOSE SERPL-MCNC: 100 MG/DL — HIGH (ref 70–99)
GLUCOSE UR QL: NEGATIVE — SIGNIFICANT CHANGE UP
HCO3 BLDV-SCNC: 31 MMOL/L — HIGH (ref 22–29)
HCT VFR BLD CALC: 32.7 % — LOW (ref 39–50)
HCT VFR BLDA CALC: 38 % — LOW (ref 39–51)
HGB BLD CALC-MCNC: 12.5 G/DL — LOW (ref 12.6–17.4)
HGB BLD-MCNC: 10.9 G/DL — LOW (ref 13–17)
HIV 1+2 AB+HIV1 P24 AG SERPL QL IA: SIGNIFICANT CHANGE UP
INR BLD: 0.92 RATIO — SIGNIFICANT CHANGE UP (ref 0.88–1.16)
KETONES UR-MCNC: NEGATIVE — SIGNIFICANT CHANGE UP
LACTATE BLDV-MCNC: 2.6 MMOL/L — HIGH (ref 0.5–2)
LEUKOCYTE ESTERASE UR-ACNC: NEGATIVE — SIGNIFICANT CHANGE UP
LIDOCAIN IGE QN: 119 U/L — HIGH (ref 7–60)
LYMPHOCYTES # BLD AUTO: 0.88 K/UL — LOW (ref 1–3.3)
LYMPHOCYTES # BLD AUTO: 26.1 % — SIGNIFICANT CHANGE UP (ref 13–44)
MANUAL SMEAR VERIFICATION: SIGNIFICANT CHANGE UP
MCHC RBC-ENTMCNC: 30.5 PG — SIGNIFICANT CHANGE UP (ref 27–34)
MCHC RBC-ENTMCNC: 33.3 GM/DL — SIGNIFICANT CHANGE UP (ref 32–36)
MCV RBC AUTO: 91.6 FL — SIGNIFICANT CHANGE UP (ref 80–100)
METHADONE UR-MCNC: NEGATIVE — SIGNIFICANT CHANGE UP
MONOCYTES # BLD AUTO: 0.09 K/UL — SIGNIFICANT CHANGE UP (ref 0–0.9)
MONOCYTES NFR BLD AUTO: 2.6 % — SIGNIFICANT CHANGE UP (ref 2–14)
NEUTROPHILS # BLD AUTO: 2.41 K/UL — SIGNIFICANT CHANGE UP (ref 1.8–7.4)
NEUTROPHILS NFR BLD AUTO: 71.3 % — SIGNIFICANT CHANGE UP (ref 43–77)
NITRITE UR-MCNC: NEGATIVE — SIGNIFICANT CHANGE UP
OPIATES UR-MCNC: NEGATIVE — SIGNIFICANT CHANGE UP
OSMOLALITY SERPL: 430 MOSMOL/KG — HIGH (ref 275–300)
OXYCODONE UR-MCNC: NEGATIVE — SIGNIFICANT CHANGE UP
PCO2 BLDV: 53 MMHG — SIGNIFICANT CHANGE UP (ref 42–55)
PCP SPEC-MCNC: SIGNIFICANT CHANGE UP
PCP UR-MCNC: NEGATIVE — SIGNIFICANT CHANGE UP
PH BLDV: 7.37 — SIGNIFICANT CHANGE UP (ref 7.32–7.43)
PH UR: 6.5 — SIGNIFICANT CHANGE UP (ref 5–8)
PLAT MORPH BLD: NORMAL — SIGNIFICANT CHANGE UP
PLATELET # BLD AUTO: 128 K/UL — LOW (ref 150–400)
PO2 BLDV: 52 MMHG — HIGH (ref 25–45)
POTASSIUM BLDV-SCNC: 3 MMOL/L — LOW (ref 3.5–5.1)
POTASSIUM SERPL-MCNC: 3.4 MMOL/L — LOW (ref 3.5–5.3)
POTASSIUM SERPL-SCNC: 3.4 MMOL/L — LOW (ref 3.5–5.3)
PROT SERPL-MCNC: 6.5 G/DL — SIGNIFICANT CHANGE UP (ref 6–8.3)
PROT UR-MCNC: SIGNIFICANT CHANGE UP
PROTHROM AB SERPL-ACNC: 10.6 SEC — SIGNIFICANT CHANGE UP (ref 10.5–13.4)
RBC # BLD: 3.57 M/UL — LOW (ref 4.2–5.8)
RBC # FLD: 15.9 % — HIGH (ref 10.3–14.5)
RBC BLD AUTO: SIGNIFICANT CHANGE UP
RSV RNA NPH QL NAA+NON-PROBE: SIGNIFICANT CHANGE UP
SAO2 % BLDV: 77 % — SIGNIFICANT CHANGE UP (ref 67–88)
SARS-COV-2 RNA SPEC QL NAA+PROBE: SIGNIFICANT CHANGE UP
SODIUM SERPL-SCNC: 146 MMOL/L — HIGH (ref 135–145)
SP GR SPEC: 1.02 — SIGNIFICANT CHANGE UP (ref 1.01–1.02)
SPECIMEN SOURCE: SIGNIFICANT CHANGE UP
THC UR QL: NEGATIVE — SIGNIFICANT CHANGE UP
UROBILINOGEN FLD QL: NEGATIVE — SIGNIFICANT CHANGE UP
WBC # BLD: 3.38 K/UL — LOW (ref 3.8–10.5)
WBC # FLD AUTO: 3.38 K/UL — LOW (ref 3.8–10.5)

## 2023-04-10 PROCEDURE — 74177 CT ABD & PELVIS W/CONTRAST: CPT | Mod: 26,MA

## 2023-04-10 PROCEDURE — L9997: CPT

## 2023-04-10 PROCEDURE — 72125 CT NECK SPINE W/O DYE: CPT | Mod: 26,MA

## 2023-04-10 PROCEDURE — 70450 CT HEAD/BRAIN W/O DYE: CPT | Mod: 26,MA

## 2023-04-10 PROCEDURE — 76377 3D RENDER W/INTRP POSTPROCES: CPT | Mod: 26

## 2023-04-10 PROCEDURE — 99285 EMERGENCY DEPT VISIT HI MDM: CPT | Mod: 25

## 2023-04-10 PROCEDURE — 71260 CT THORAX DX C+: CPT | Mod: 26,MA

## 2023-04-10 PROCEDURE — 70486 CT MAXILLOFACIAL W/O DYE: CPT | Mod: 26,MA

## 2023-04-10 PROCEDURE — 72170 X-RAY EXAM OF PELVIS: CPT | Mod: 26

## 2023-04-10 PROCEDURE — 36000 PLACE NEEDLE IN VEIN: CPT

## 2023-04-10 PROCEDURE — 71045 X-RAY EXAM CHEST 1 VIEW: CPT | Mod: 26

## 2023-04-10 RX ORDER — THIAMINE MONONITRATE (VIT B1) 100 MG
100 TABLET ORAL ONCE
Refills: 0 | Status: COMPLETED | OUTPATIENT
Start: 2023-04-10 | End: 2023-04-10

## 2023-04-10 RX ORDER — SODIUM CHLORIDE 9 MG/ML
1000 INJECTION, SOLUTION INTRAVENOUS
Refills: 0 | Status: DISCONTINUED | OUTPATIENT
Start: 2023-04-10 | End: 2023-04-16

## 2023-04-10 RX ORDER — SODIUM CHLORIDE 9 MG/ML
1000 INJECTION INTRAMUSCULAR; INTRAVENOUS; SUBCUTANEOUS ONCE
Refills: 0 | Status: COMPLETED | OUTPATIENT
Start: 2023-04-10 | End: 2023-04-10

## 2023-04-10 RX ORDER — DIAZEPAM 5 MG
5 TABLET ORAL ONCE
Refills: 0 | Status: DISCONTINUED | OUTPATIENT
Start: 2023-04-10 | End: 2023-04-10

## 2023-04-10 RX ORDER — FOLIC ACID 0.8 MG
1 TABLET ORAL ONCE
Refills: 0 | Status: COMPLETED | OUTPATIENT
Start: 2023-04-10 | End: 2023-04-10

## 2023-04-10 RX ORDER — METHADONE HYDROCHLORIDE 40 MG/1
20 TABLET ORAL ONCE
Refills: 0 | Status: DISCONTINUED | OUTPATIENT
Start: 2023-04-10 | End: 2023-04-10

## 2023-04-10 RX ADMIN — Medication 50 MILLIGRAM(S): at 03:24

## 2023-04-10 RX ADMIN — Medication 1 MILLIGRAM(S): at 14:06

## 2023-04-10 RX ADMIN — METHADONE HYDROCHLORIDE 20 MILLIGRAM(S): 40 TABLET ORAL at 23:34

## 2023-04-10 RX ADMIN — Medication 50 MILLIGRAM(S): at 23:34

## 2023-04-10 RX ADMIN — Medication 100 MILLIGRAM(S): at 13:14

## 2023-04-10 RX ADMIN — SODIUM CHLORIDE 1000 MILLILITER(S): 9 INJECTION INTRAMUSCULAR; INTRAVENOUS; SUBCUTANEOUS at 13:15

## 2023-04-10 RX ADMIN — Medication 5 MILLIGRAM(S): at 23:35

## 2023-04-10 RX ADMIN — SODIUM CHLORIDE 150 MILLILITER(S): 9 INJECTION, SOLUTION INTRAVENOUS at 21:58
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
## 2023-04-10 NOTE — ED PROCEDURE NOTE - CPROC ED TIME OUT STATEMENT1
“Patient's name, , procedure and correct site were confirmed during the [place] Timeout.” “Patient's name, , procedure and correct site were confirmed during the [place] Timeout.” “Patient's name, , procedure and correct site were confirmed during the [place] Timeout.”

## 2023-04-10 NOTE — PROVIDER CONTACT NOTE (OTHER) - ASSESSMENT
The provider informed the  that the patient is ready for discharge and requires a metro card.  met patient at his bedside and provide him with metro card number [phone]. No further SW intervention is required at this time. The provider informed the  that the patient is ready for discharge and requires a metro card.  met patient at his bedside and provide him with metro card number [phone]. No further SW intervention is required at this time. The provider informed the  that the patient is ready for discharge and requires a metro card.  met patient at his bedside and provide him with metro card number [phone]. No further SW intervention is required at this time. The provider informed the  that the patient is ready for discharge and requires a metro card.  met patient at his bedside and provide him with metro card number [phone]. No further SW intervention is required at this time. The provider informed the  that the patient is ready for discharge and requires a metro card.  met patient at his bedside and provide him with metro card number [phone]. No further SW intervention is required at this time. The provider informed the  that the patient is ready for discharge and requires a metro card.  met patient at his bedside and provide him with metro card number [phone]. No further SW intervention is required at this time.

## 2023-04-10 NOTE — ED ADULT NURSE REASSESSMENT NOTE - NS ED NURSE REASSESS COMMENT FT1
Break RN note- Patient resting quietly in bed, breathing even and nonlabored. No acute distress. Cardiac monitor in place- sinus tach. End tidal in place. Patient breathing without issue. Patient intermittently awake to verbal stimuli. Patient moving all four extremities even and equally. Safety maintained. Awaiting CT. Patient stable upon exiting the room.
Pt presents more awake and alert, speaking in clear and coherent sentences. Respirations are even and unlabored, no signs of respiratory distress.

## 2023-04-10 NOTE — ED CLERICAL - NS ED CLERK UNITS
Bed: 12  Expected date: 4/3/22  Expected time: 1:11 PM  Means of arrival:   Comments:  Seizure     APER
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
## 2023-04-10 NOTE — ED ADULT NURSE NOTE - NSIMPLEMENTINTERV_GEN_ALL_ED
Implemented All Fall Risk Interventions:  [place] to call system. Call bell, personal items and telephone within reach. Instruct patient to call for assistance. Room bathroom lighting operational. Non-slip footwear when patient is off stretcher. Physically safe environment: no spills, clutter or unnecessary equipment. Stretcher in lowest position, wheels locked, appropriate side rails in place. Provide visual cue, wrist band, yellow gown, etc. Monitor gait and stability. Monitor for mental status changes and reorient to person, place, and time. Review medications for side effects contributing to fall risk. Reinforce activity limits and safety measures with patient and family. Implemented All Fall Risk Interventions:  [place] to call system. Call bell, personal items and telephone within reach. Instruct patient to call for assistance. Room bathroom lighting operational. Non-slip footwear when patient is off stretcher. Physically safe environment: no spills, clutter or unnecessary equipment. Stretcher in lowest position, wheels locked, appropriate side rails in place. Provide visual cue, wrist band, yellow gown, etc. Monitor gait and stability. Monitor for mental status changes and reorient to person, place, and time. Review medications for side effects contributing to fall risk. Reinforce activity limits and safety measures with patient and family. Implemented All Fall Risk Interventions:  [place] to call system. Call bell, personal items and telephone within reach. Instruct patient to call for assistance. Room bathroom lighting operational. Non-slip footwear when patient is off stretcher. Physically safe environment: no spills, clutter or unnecessary equipment. Stretcher in lowest position, wheels locked, appropriate side rails in place. Provide visual cue, wrist band, yellow gown, etc. Monitor gait and stability. Monitor for mental status changes and reorient to person, place, and time. Review medications for side effects contributing to fall risk. Reinforce activity limits and safety measures with patient and family.

## 2023-04-10 NOTE — ED PROVIDER NOTE - PHYSICAL EXAMINATION
GEN: Pt chronically ill appearing, disheveled, wearing dirty clothing and old hospital band.   PSYCH: Somnolent, responds to questioning.  EYES: Sclera white w/o injection.   ENT: Head NCAT. MMM. Neck supple FROM.  RESP: CTA b/l, no wheezes, rales, or rhonchi.   CARDIAC: RRR, clear distinct S1, S2, no appreciable murmurs.  ABD: Abdomen soft, non-tender. No CVAT b/l.  VASC: 2+ radial and dorsalis pedis pulses b/l. No edema or calf tenderness.  SKIN: No rashes on the trunk.     GEN: Pt in NAD, A&O x3. GCS 15.   EYES: L periorbital ecchymosis, PERRL, EOMI. Pt refuses to cooperate with [facility] testing,  IOP OS-11 ; OD-13.  HENT: Head NCAT. Nares without deformity, no DC. No auricular tenderness, no ear DC. no Tenorio's sign. No dental trauma. Neck supple FROM. No midline or paracervical tenderness. Trachea midline.   RESP: No retractions or chest wall deformities, no signs of trauma/bruising. No chest wall tenderness, CTA b/l, no wheezes, rales, or rhonchi.   CARDIAC: RRR clear distinct S1, S2, no murmurs, gallops, or rubs.   ABDOMEN: Abdomen without any obvious deformities, no signs of trauma or bruising. Abdomen soft, non-tender. No CVAT b/l.   VASC: 2+ radial and dorsalis pedis pulses b/l.   MSK: No joint erythema or obvious deformities. Spine without obvious deformity. No midline spinal tenderness or step-offs. Pelvis stable. No bony tenderness. FROM w/o pain of UE and LE b/l.   NEURO: CN2-12 intact. Normal and equal sensation UE, LE and face b/l. 5/5 strength upper and lower extremity b/l. Pronator drift negative. Normal gait and gross cerebellar functioning.   SKIN: No rashes noted. GEN: Pt chronically ill appearing, disheveled, wearing dirty clothing and old hospital band. GCS 15.  PSYCH: Somnolent, responds to questioning.  EYES: Sclera white w/o injection.   ENT: Head NCAT. MMM. Neck supple FROM.  RESP: CTA b/l, no wheezes, rales, or rhonchi.   CARDIAC: RRR, clear distinct S1, S2, no appreciable murmurs.  ABD: Abdomen soft, non-tender. No CVAT b/l.  VASC: 2+ radial and dorsalis pedis pulses b/l. No edema or calf tenderness.  SKIN: No rashes on the trunk.     GEN: Pt chronically ill appearing, disheveled, wearing dirty clothing and old hospital band. GCS 15.  PSYCH: Somnolent, responds to questioning.  EYES: L periorbital ecchymosis, PERRL, EOMI. Pt refuses to cooperate with [facility] testing,  IOP OS-11 ; OD-13.  HENT: AIrway patent. L nares dried blood, no active epistaxis, no septal hematoma. No auricular tenderness, no ear DC. no Tenorio's sign. Neck supple FROM. No midline or paracervical tenderness. Trachea midline.   RESP: No retractions or chest wall deformities, no signs of trauma/bruising. No chest wall tenderness, CTA b/l, no wheezes, rales, or rhonchi.   CARDIAC: RRR clear distinct S1, S2, no murmurs.  ABDOMEN: Abdomen without any obvious deformities, no signs of trauma or bruising. Abdomen  diffusely tender, no rebound.  VASC: 2+ radial and dorsalis pedis pulses b/l.   MSK: No joint erythema or obvious deformities. Spine without obvious deformity. No midline spinal tenderness or step-offs. Pelvis stable. No bony tenderness. Moving b/l UE and LE equally.  NEURO: CN2-12 intact. No focal motor or sensory deficits. Pronator drift negative. Ambulatory.  SKIN: Scattered ecchymosis and dried blood. GEN: Pt chronically ill appearing, disheveled, wearing dirty clothing and old hospital band. GCS 15.  PSYCH: Somnolent, responds to questioning.  EYES: L periorbital ecchymosis, PERRL, EOMI. Pt refuses to cooperate with [facility] testing,  IOP OS-11 ; OD-13.  HENT: [place] patent. L nares dried blood, no active epistaxis, no septal hematoma. No auricular tenderness, no ear DC. no Tenorio's sign. Neck supple FROM. No midline or paracervical tenderness. Trachea midline.   RESP: No retractions or chest wall deformities, no signs of trauma/bruising. No chest wall tenderness, CTA b/l, no wheezes, rales, or rhonchi.   CARDIAC: RRR clear distinct S1, S2, no murmurs.  ABDOMEN: Abdomen without any obvious deformities, no signs of trauma or bruising. Abdomen diffusely tender, no rebound.  : No ecchymosis on the pelvic region, scrotum, or perineum. No blood at the urethral meatus. Chaperone: Dr. Gan.  VASC: 2+ radial and dorsalis pedis pulses b/l.   MSK: No joint erythema or obvious deformities. Spine without obvious deformity. No midline spinal tenderness or step-offs. Pelvis stable. No bony tenderness. Moving b/l UE and LE equally.  NEURO: CN2-12 intact. No focal motor or sensory deficits. Pronator drift negative. Ambulatory.  SKIN: Scattered ecchymosis and dried blood.

## 2023-04-10 NOTE — ED ADULT NURSE REASSESSMENT NOTE - NS ED NURSE REASSESS COMMENT FT1
As per MD foster patient is able to eat. Patient given a sandwich and juice. Will draw repeat lactate 1 hour after initiating maintenance fluids.
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
## 2023-04-10 NOTE — ED ADULT NURSE REASSESSMENT NOTE - NS ED NURSE REASSESS COMMENT FT1
Received report from RN Subrena. RAMUS. Patient A&Ox4. CIWA documented. Patient pending results at this time. 1:1 [place] at the bedside.

## 2023-04-10 NOTE — ED PROCEDURE NOTE - ATTENDING APP SHARED VISIT CONTRIBUTION OF CARE
I, EM Attending, Parker Gan was available for consultation and supervision for the procedure that was performed by the Resident Physician or IRENE.

## 2023-04-10 NOTE — ED PROCEDURE NOTE - PROCEDURE ADDITIONAL DETAILS
Peripheral IV access in the Emergency Department obtained under dynamic ultrasound guidance with dark nonpulsatile blood return.  Catheter was flushed afterwards without any resistance or resulting extravasation.  IV catheter confirmed in compressible vein after insertion. 18 gauge catheter placed in a peripheral vein in the left upper extremity.   POCUS: Emergency Department Focused Ultrasound performed at patient's bedside.  The complete report will be available in PACS.
Peripheral IV access in the Emergency Department obtained under dynamic ultrasound guidance with dark nonpulsatile blood return.  Catheter was flushed afterwards without any resistance or resulting extravasation.  IV catheter confirmed in compressible vein after insertion. 18 gauge catheter placed in a peripheral vein in the right upper extremity.
POCUS: Emergency Department Focused Ultrasound performed at patient's bedside.  The complete report will be available in PACS.

## 2023-04-11 DIAGNOSIS — D75.9 DISEASE OF BLOOD AND BLOOD-FORMING ORGANS, UNSPECIFIED: ICD-10-CM
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
## 2023-04-11 DIAGNOSIS — F10.929 ALCOHOL USE, UNSPECIFIED WITH INTOXICATION, UNSPECIFIED: ICD-10-CM

## 2023-04-11 DIAGNOSIS — E86.0 DEHYDRATION: ICD-10-CM

## 2023-04-11 DIAGNOSIS — R33.9 RETENTION OF URINE, UNSPECIFIED: ICD-10-CM

## 2023-04-11 DIAGNOSIS — R79.89 OTHER SPECIFIED ABNORMAL FINDINGS OF BLOOD CHEMISTRY: ICD-10-CM

## 2023-04-11 LAB
CREAT ?TM UR-MCNC: 103 MG/DL — SIGNIFICANT CHANGE UP
FOLATE SERPL-MCNC: 4.4 NG/ML — LOW
LACTATE SERPL-SCNC: 3.2 MMOL/L — HIGH (ref 0.5–2)
OSMOLALITY UR: 789 MOS/KG — SIGNIFICANT CHANGE UP (ref 300–900)
POTASSIUM UR-SCNC: 37 MMOL/L — SIGNIFICANT CHANGE UP
PROT ?TM UR-MCNC: 23 MG/DL — HIGH (ref 0–12)
PROT/CREAT UR-RTO: 0.2 RATIO — SIGNIFICANT CHANGE UP (ref 0–0.2)
SODIUM UR-SCNC: >240 MMOL/L — SIGNIFICANT CHANGE UP
UUN UR-MCNC: 302 MG/DL — SIGNIFICANT CHANGE UP

## 2023-04-11 PROCEDURE — 99223 1ST HOSP IP/OBS HIGH 75: CPT

## 2023-04-11 RX ORDER — METHADONE HYDROCHLORIDE 40 MG/1
60 TABLET ORAL ONCE
Refills: 0 | Status: DISCONTINUED | OUTPATIENT
Start: 2023-04-11 | End: 2023-04-11

## 2023-04-11 RX ORDER — LANOLIN ALCOHOL/MO/W.PET/CERES
3 CREAM (GRAM) TOPICAL AT BEDTIME
Refills: 0 | Status: DISCONTINUED | OUTPATIENT
Start: 2023-04-11 | End: 2023-04-18

## 2023-04-11 RX ORDER — DIAZEPAM 5 MG
2.5 TABLET ORAL ONCE
Refills: 0 | Status: DISCONTINUED | OUTPATIENT
Start: 2023-04-11 | End: 2023-04-11

## 2023-04-11 RX ORDER — ONDANSETRON 8 MG/1
4 TABLET, FILM COATED ORAL EVERY 8 HOURS
Refills: 0 | Status: DISCONTINUED | OUTPATIENT
Start: 2023-04-11 | End: 2023-04-18

## 2023-04-11 RX ORDER — PANTOPRAZOLE SODIUM 20 MG/1
40 TABLET, DELAYED RELEASE ORAL
Refills: 0 | Status: DISCONTINUED | OUTPATIENT
Start: 2023-04-11 | End: 2023-04-18

## 2023-04-11 RX ORDER — NICOTINE POLACRILEX 2 MG
1 GUM BUCCAL DAILY
Refills: 0 | Status: DISCONTINUED | OUTPATIENT
Start: 2023-04-11 | End: 2023-04-18

## 2023-04-11 RX ORDER — ACETAMINOPHEN 500 MG
650 TABLET ORAL EVERY 6 HOURS
Refills: 0 | Status: DISCONTINUED | OUTPATIENT
Start: 2023-04-11 | End: 2023-04-18

## 2023-04-11 RX ORDER — FOLIC ACID 0.8 MG
1 TABLET ORAL DAILY
Refills: 0 | Status: DISCONTINUED | OUTPATIENT
Start: 2023-04-11 | End: 2023-04-18

## 2023-04-11 RX ORDER — METHADONE HYDROCHLORIDE 40 MG/1
60 TABLET ORAL DAILY
Refills: 0 | Status: DISCONTINUED | OUTPATIENT
Start: 2023-04-11 | End: 2023-04-11

## 2023-04-11 RX ORDER — THIAMINE MONONITRATE (VIT B1) 100 MG
100 TABLET ORAL DAILY
Refills: 0 | Status: COMPLETED | OUTPATIENT
Start: 2023-04-11 | End: 2023-04-13

## 2023-04-11 RX ADMIN — SODIUM CHLORIDE 150 MILLILITER(S): 9 INJECTION, SOLUTION INTRAVENOUS at 17:46

## 2023-04-11 RX ADMIN — METHADONE HYDROCHLORIDE 60 MILLIGRAM(S): 40 TABLET ORAL at 11:44

## 2023-04-11 RX ADMIN — Medication 1 MILLIGRAM(S): at 11:15

## 2023-04-11 RX ADMIN — Medication 2.5 MILLIGRAM(S): at 03:24

## 2023-04-11 RX ADMIN — PANTOPRAZOLE SODIUM 40 MILLIGRAM(S): 20 TABLET, DELAYED RELEASE ORAL at 05:13

## 2023-04-11 RX ADMIN — Medication 2 MILLIGRAM(S): at 18:11

## 2023-04-11 RX ADMIN — Medication 1 PATCH: at 09:10

## 2023-04-11 RX ADMIN — Medication 2 MILLIGRAM(S): at 12:21

## 2023-04-11 RX ADMIN — Medication 2 MILLIGRAM(S): at 15:08

## 2023-04-11 RX ADMIN — Medication 50 MILLIGRAM(S): at 11:15

## 2023-04-11 RX ADMIN — Medication 50 MILLIGRAM(S): at 17:27

## 2023-04-11 RX ADMIN — Medication 2 MILLIGRAM(S): at 19:28

## 2023-04-11 RX ADMIN — Medication 2 MILLIGRAM(S): at 20:22

## 2023-04-11 RX ADMIN — Medication 2 MILLIGRAM(S): at 07:26

## 2023-04-11 RX ADMIN — Medication 2 MILLIGRAM(S): at 22:25

## 2023-04-11 RX ADMIN — Medication 1 TABLET(S): at 00:19

## 2023-04-11 RX ADMIN — Medication 2 MILLIGRAM(S): at 16:28

## 2023-04-11 RX ADMIN — Medication 1 PATCH: at 17:56

## 2023-04-11 RX ADMIN — Medication 2 MILLIGRAM(S): at 23:23

## 2023-04-11 RX ADMIN — Medication 50 MILLIGRAM(S): at 04:16

## 2023-04-11 RX ADMIN — Medication 2 MILLIGRAM(S): at 05:13

## 2023-04-11 RX ADMIN — Medication 100 MILLIGRAM(S): at 11:14

## 2023-04-11 RX ADMIN — Medication 1 TABLET(S): at 11:15

## 2023-04-11 RX ADMIN — Medication 1 PATCH: at 21:58
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
## 2023-04-11 NOTE — OCCUPATIONAL THERAPY INITIAL EVALUATION ADULT - DIAGNOSIS, OT EVAL
Pt presents with impaired balance during ambulation 2* alcohol withdraw impacting functional mobility and ADLs.

## 2023-04-11 NOTE — H&P ADULT - PROBLEM SELECTOR PLAN 5
initial ct a+p showed large distended urinary bladder + bilateral hydronephrosis  post void bladder scan showed 151 ml urine within bladder  follow up kidney + bladder us in am  monitor uo and bladder scan q4-6h in the mean time
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
## 2023-04-11 NOTE — H&P ADULT - ASSESSMENT
33 yo m w pmh alcohol abuse, opioid abuse, hcv, adhd, ptsd, p/w alcohol intoxication, concern for impending alcohol withdrawal syndrome, admitted to medicine for further mgmt  31 yo m w pmh alcohol abuse, opioid abuse, hcv, adhd, ptsd, p/w alcohol intoxication, concern for impending alcohol withdrawal syndrome, admitted to medicine for further mgmt

## 2023-04-11 NOTE — PROVIDER CONTACT NOTE (OTHER) - SITUATION
Patient refusing to wear tele box and refusing AM labs
pts CIWA peaked at 21 since arrival to unit, has required medication every hour since arrival

## 2023-04-11 NOTE — H&P ADULT - PROBLEM SELECTOR PLAN 1
h/o alcohol abuse, opioid abuse, hcv, adhd, ptsd  c/f impending alcohol withdrawal syndrome  bac ~376, utox + for benzo  Ciwa on encounter 7  s/p valium, librium, methadone, folate, thiamine, mv in ER  neuroimaging with no acute significant pathological findings  Monitor mental status with frequent neurochecks  ciwa protocol of symptom triggered therapy with po/ivp ativan + fixed schedule taper with po librium  multivitamin, thiamine, folic acid supplementation  symptomatic relief with ppi, antiemetics, analgesics as needed  aggressive ivf resuscitation + lytes as needed; encourage po intake  fall, seizure, delirium, aspiration precaution with head end of bed elevated  psych consult in am  pt eval + sw/cm consult for disposition h/o alcohol abuse, opioid abuse, hcv, adhd (?on dextroamp 30 mg daily), ptsd (?on escitalpram 10 mg daily)  c/f impending alcohol withdrawal syndrome  last drink reportedly 9 am 4/10/23  bac ~376, utox + for benzo  Ciwa on encounter 7  s/p valium, librium, methadone, folate, thiamine, mv in ER  neuroimaging with no acute significant pathological findings  Monitor mental status with frequent neurochecks  ciwa protocol of symptom triggered therapy with po/ivp ativan + fixed schedule taper with po librium  multivitamin, thiamine, folic acid supplementation  symptomatic relief with ppi, antiemetics, analgesics as needed  aggressive ivf resuscitation + lytes as needed; encourage po intake  fall, seizure, delirium, aspiration precaution with head end of bed elevated  psych consult in am  pt eval + sw/cm consult for disposition

## 2023-04-11 NOTE — CHART NOTE - NSCHARTNOTEFT_GEN_A_CORE
Patient seen as second touch.  Agree with plan as documented in H/P    At time of exam, patient with CIWA-Ar of 9 (+5 tremor, +4 for anxiety) denies tinnitus or hallucinations, denies nausea or vomiting, denies tactile hallucinations, denies headache, is alert and oriented to person, place and time.     Will continue to monitor, patient is high risk for adverse outcomes from alcohol withdrawal given severity of symptoms, recent consumption, and BAL on admission.

## 2023-04-11 NOTE — H&P ADULT - PROBLEM SELECTOR PLAN 6
hyperNa, hypok, high anion gap (lactic acidosis + elevated bac); all consistent with dehydration/hypovolemia iso alcohol intoxication  s/p 1 L NS in ER  Monitor I/Os, daily weights, UO, BUN/Cr, volume status, acid-base balance, electrolytes   follow up urine studies  trend na q4-6h until wnl; goal rate of correction ~8 meq/l/day  trend K q4-6h until wnl   trend lactate q4-6h until wnl  avoid nephrotoxic agents; appropriate dose adjustments for all renally cleared medications   encourage po intake; IVF resusci + electrolyte supplementation as needed in the mean time

## 2023-04-11 NOTE — H&P ADULT - PROBLEM SELECTOR PLAN 4
mild normocytic anemia + mild thrombocytopenia, likely iso chronic alcohol abuse  ct imaging with no evidence of cirrhosis, portal htn, splenomegaly  Monitor hgb and plt w daily CBC; Goal Hb >7 g/dl,  Plt >10k, >20k if septic, >50k if actively bleeding  follow up FOBT; iron studies; Folate, Vit B12; reticulocyte count; LDH, Haptoglobin; TSH  maintain adequate PIV and active type and screen; transfuse blood product as needed to maintain goal

## 2023-04-11 NOTE — CHART NOTE - NSCHARTNOTEFT_GEN_A_CORE
Called by the nurse to report that patient wants to leave AMA. Patient seen at bedside, patient requesting daily methadone dose. States he take Methadone 120mg daily and last dose was 3 days ago. Called [facility] to verify dose. NP Gavino Pimentel States patient las dose was on March 23rd, 2023. Pr their policy dose is decreases by 10mg q3 days that's missed. States patient can be started safely on Methadone 60mg daily.  Patient appears tremulous and weak. 1:1 in place for safety.  Will d/w attending.

## 2023-04-11 NOTE — PHYSICAL THERAPY INITIAL EVALUATION ADULT - GENERAL OBSERVATIONS, REHAB EVAL
Pt a/w ETOH withdrawal, +CIWA, +L periorbital swelling (-) fx, old healing antlat R 4&5 rib fx (CT abd/pelvis-bones section). Pt received supine on stretcher in ED< +IVL, A&OX3 (person, hospital, year (unable to state month/day), +1:1 observation, +ecchymosis L eye.

## 2023-04-11 NOTE — ED ADULT NURSE REASSESSMENT NOTE - NS ED NURSE REASSESS COMMENT FT1
Called provider at [phone] to notify her patient wants to speak with a doctor and possibly leave AMA. Provider will come down to assess patient. Called provider at [phone] to notify her patient wants to speak with a doctor and possibly leave AMA. Provider will come down to assess patient. Called provider at [phone] to notify her patient wants to speak with a doctor and possibly leave AMA. Provider will come down to assess patient.
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
## 2023-04-11 NOTE — H&P ADULT - NSHPSOCIALHISTORY_GEN_ALL_CORE
alcohol abuse  opioid abuse  no further clarification on the details of each of the aforementioned given alc intox

## 2023-04-11 NOTE — H&P ADULT - NSHPADDITIONALINFOADULT_GEN_ALL_CORE
full code   activity as tolerated   vte ppx w lovenox  regular diet full code   activity as tolerated   vte ppx w lovenox  regular diet    **clarify home meds in am**

## 2023-04-11 NOTE — H&P ADULT - PROBLEM SELECTOR PLAN 2
h/o hcv  ast:alt > 2:1, ct a+p shows fatty liver, ck wnl; all likely iso alcohol intoxication  albumin and coags wnl  follow up ruq us, viral hep panel  avoid hepatotoxic agents;   trend lfts daily

## 2023-04-11 NOTE — H&P ADULT - NSICDXPASTMEDICALHX_GEN_ALL_CORE_FT
PAST MEDICAL HISTORY:  ADHD     Alcohol abuse     Opioid abuse     Post traumatic stress disorder (PTSD)

## 2023-04-11 NOTE — H&P ADULT - NSHPPHYSICALEXAM_GEN_ALL_CORE
T(C): 36.8 (04-11-23 @ 01:41), Max: 36.8 (04-11-23 @ 01:41)  HR: 110 (04-11-23 @ 01:41) (98 - 110)  BP: 108/67 (04-11-23 @ 01:41) (103/60 - 119/84)  RR: 20 (04-11-23 @ 01:41) (15 - 20)  SpO2: 98% (04-11-23 @ 01:41) (98% - 99%)  GENERAL: NAD, lying in bed, agitated, uncooperative with exam, disheveled + unkempt   EYES: EOMI, PERRLA; conjunctiva and sclera clear  ENMT: dry oral mucosa, no pharyngeal injection or exudates  NECK: Supple, no palpable masses; no JVD  RESPIRATORY: Normal respiratory effort; lungs are clear to auscultation bilaterally  CARDIOVASCULAR: tachycardic, normal S1 and S2, no murmur/rub/gallop; No lower extremity edema; Peripheral pulses are 2+ bilaterally  ABDOMEN: Nontender to palpation, normoactive bowel sounds, no rebound/guarding   MUSCULOSKELETAL: no joint swelling or tenderness to palpation  PSYCH: A+O to person, place, and time; agitated, aggressive behavior   NEUROLOGY: CN 2-12 are intact and symmetric; no gross motor or sensory deficits   SKIN: No rashes; no palpable lesions

## 2023-04-11 NOTE — PROVIDER CONTACT NOTE (OTHER) - ASSESSMENT
Patient A&O4. VSS.
pts CIWA peaked at 21 at 1515, has been given PRN ativan Q1hr (when it does not fall on standing librium dose) since arrival to unit

## 2023-04-11 NOTE — PROVIDER CONTACT NOTE (OTHER) - REASON
pts CIWA peaked at 21 since arrival to unit, has required medication every hour since arrival
Patient refusing to wear tele box and refusing AM labs

## 2023-04-12 DIAGNOSIS — F11.90 OPIOID USE, UNSPECIFIED, UNCOMPLICATED: ICD-10-CM

## 2023-04-12 LAB
ALBUMIN SERPL ELPH-MCNC: 3.9 G/DL — SIGNIFICANT CHANGE UP (ref 3.3–5)
ALP SERPL-CCNC: 106 U/L — SIGNIFICANT CHANGE UP (ref 40–120)
ALT FLD-CCNC: 34 U/L — SIGNIFICANT CHANGE UP (ref 10–45)
ANION GAP SERPL CALC-SCNC: 10 MMOL/L — SIGNIFICANT CHANGE UP (ref 5–17)
AST SERPL-CCNC: 48 U/L — HIGH (ref 10–40)
BILIRUB SERPL-MCNC: 0.5 MG/DL — SIGNIFICANT CHANGE UP (ref 0.2–1.2)
BUN SERPL-MCNC: <4 MG/DL — LOW (ref 7–23)
CALCIUM SERPL-MCNC: 9 MG/DL — SIGNIFICANT CHANGE UP (ref 8.4–10.5)
CHLORIDE SERPL-SCNC: 98 MMOL/L — SIGNIFICANT CHANGE UP (ref 96–108)
CO2 SERPL-SCNC: 29 MMOL/L — SIGNIFICANT CHANGE UP (ref 22–31)
CREAT SERPL-MCNC: 0.52 MG/DL — SIGNIFICANT CHANGE UP (ref 0.5–1.3)
EGFR: 136 ML/MIN/1.73M2 — SIGNIFICANT CHANGE UP
GLUCOSE SERPL-MCNC: 112 MG/DL — HIGH (ref 70–99)
HCT VFR BLD CALC: 29.5 % — LOW (ref 39–50)
HGB BLD-MCNC: 9.7 G/DL — LOW (ref 13–17)
LACTATE SERPL-SCNC: 0.6 MMOL/L — SIGNIFICANT CHANGE UP (ref 0.5–2)
MCHC RBC-ENTMCNC: 30.6 PG — SIGNIFICANT CHANGE UP (ref 27–34)
MCHC RBC-ENTMCNC: 32.9 GM/DL — SIGNIFICANT CHANGE UP (ref 32–36)
MCV RBC AUTO: 93.1 FL — SIGNIFICANT CHANGE UP (ref 80–100)
NRBC # BLD: 0 /100 WBCS — SIGNIFICANT CHANGE UP (ref 0–0)
PLATELET # BLD AUTO: 114 K/UL — LOW (ref 150–400)
POTASSIUM SERPL-MCNC: 3.7 MMOL/L — SIGNIFICANT CHANGE UP (ref 3.5–5.3)
POTASSIUM SERPL-SCNC: 3.7 MMOL/L — SIGNIFICANT CHANGE UP (ref 3.5–5.3)
PROT SERPL-MCNC: 6.1 G/DL — SIGNIFICANT CHANGE UP (ref 6–8.3)
RBC # BLD: 3.17 M/UL — LOW (ref 4.2–5.8)
RBC # FLD: 15.4 % — HIGH (ref 10.3–14.5)
SODIUM SERPL-SCNC: 137 MMOL/L — SIGNIFICANT CHANGE UP (ref 135–145)
WBC # BLD: 2.38 K/UL — LOW (ref 3.8–10.5)
WBC # FLD AUTO: 2.38 K/UL — LOW (ref 3.8–10.5)

## 2023-04-12 PROCEDURE — 99222 1ST HOSP IP/OBS MODERATE 55: CPT

## 2023-04-12 PROCEDURE — 99232 SBSQ HOSP IP/OBS MODERATE 35: CPT

## 2023-04-12 PROCEDURE — 76770 US EXAM ABDO BACK WALL COMP: CPT | Mod: 26

## 2023-04-12 RX ORDER — THIAMINE MONONITRATE (VIT B1) 100 MG
500 TABLET ORAL THREE TIMES A DAY
Refills: 0 | Status: COMPLETED | OUTPATIENT
Start: 2023-04-12 | End: 2023-04-15

## 2023-04-12 RX ORDER — METHADONE HYDROCHLORIDE 40 MG/1
60 TABLET ORAL DAILY
Refills: 0 | Status: DISCONTINUED | OUTPATIENT
Start: 2023-04-12 | End: 2023-04-18

## 2023-04-12 RX ADMIN — Medication 50 MILLIGRAM(S): at 13:13

## 2023-04-12 RX ADMIN — Medication 2 MILLIGRAM(S): at 01:52

## 2023-04-12 RX ADMIN — Medication 1 PATCH: at 07:35

## 2023-04-12 RX ADMIN — Medication 100 MILLIGRAM(S): at 09:11

## 2023-04-12 RX ADMIN — Medication 2 MILLIGRAM(S): at 01:41

## 2023-04-12 RX ADMIN — PANTOPRAZOLE SODIUM 40 MILLIGRAM(S): 20 TABLET, DELAYED RELEASE ORAL at 09:12

## 2023-04-12 RX ADMIN — Medication 1 MILLIGRAM(S): at 09:12

## 2023-04-12 RX ADMIN — Medication 1 TABLET(S): at 09:12

## 2023-04-12 RX ADMIN — METHADONE HYDROCHLORIDE 60 MILLIGRAM(S): 40 TABLET ORAL at 11:00

## 2023-04-12 RX ADMIN — Medication 50 MILLIGRAM(S): at 06:19

## 2023-04-12 RX ADMIN — Medication 105 MILLIGRAM(S): at 21:50

## 2023-04-12 RX ADMIN — Medication 2 MILLIGRAM(S): at 21:50

## 2023-04-12 RX ADMIN — Medication 2 MILLIGRAM(S): at 00:49

## 2023-04-12 RX ADMIN — Medication 1 PATCH: at 09:13

## 2023-04-12 RX ADMIN — Medication 2 MILLIGRAM(S): at 15:24

## 2023-04-12 RX ADMIN — Medication 2 MILLIGRAM(S): at 17:50

## 2023-04-12 NOTE — PROGRESS NOTE ADULT - PROBLEM SELECTOR PLAN 2
h/o hcv  ast:alt > 2:1, ct a+p shows fatty liver, ck wnl; all likely iso alcohol intoxication  albumin and coags wnl  follow up ruq us, viral hep panel  avoid hepatotoxic agents;   trend lfts daily h/o hcv  ast:alt > 2:1, ct a+p shows fatty liver, ck wnl; all likely iso alcohol intoxication  albumin and coags wnl  -f/u viral hep panel  -avoid hepatotoxic agents;   -trend lfts daily

## 2023-04-12 NOTE — BH CONSULTATION LIAISON ASSESSMENT NOTE - NSBHCONSULTRECOMMENDOTHER_PSY_A_CORE FT
Safety: No indication for psychiatric CO but would consider constant given delirium/fall risk    Labs: TSH, b12, vit d, mag, phos, RPR, HIV, replete as necessary  EKG to monitor QTc, if >500 No antipsychotics    Medications:  Would change librium taper to ativan given elevated LFTs + Hx of HCV;  Place patient on high risk ativan taper given hx of icu admissions/withdrawal seizures + Sx triggered CIWA  Melatonin 3mg PO Q8pm  High dose thiamine 500mg TID IVP x 3-5 days  Folate supplementation  agitation: Ativan 2mg PO/IM/IV Q6H PRN     Dispo: No capacity to leave [place] at this time  SBIRT consult  Safety: No indication for psychiatric CO but would consider constant given delirium/fall risk    Labs: TSH, b12, vit d, mag, phos, RPR, HIV, replete as necessary  EKG to monitor QTc, if >500 No antipsychotics    Medications:  Would change librium taper to ativan given elevated LFTs + Hx of HCV;  Place patient on high risk ativan taper given hx of icu admissions/withdrawal seizures + Sx triggered CIWA  Melatonin 3mg PO Q8pm  High dose thiamine 500mg TID IVP x 3-5 days  Folate supplementation  agitation: Ativan 2mg PO/IM/IV Q6H PRN     Dispo: No capacity to leave [place] at this time  SBIRT consult  Safety: No indication for psychiatric CO but would consider constant given delirium/fall risk    Labs: TSH, b12, vit d, mag, phos, RPR, HIV, replete as necessary  EKG to monitor QTc, if >500 No antipsychotics    Medications:  Would change librium taper to ativan given elevated LFTs + Hx of HCV;  Place patient on high risk ativan taper given hx of icu admissions/withdrawal seizures + Sx triggered CIWA  Melatonin 3mg PO Q8pm  High dose thiamine 500mg TID IVP x 3-5 days  Folate supplementation  agitation: Ativan 2mg PO/IM/IV Q6H PRN     Dispo: No capacity to leave [place] at this time  SBIRT consult  Safety: No indication for psychiatric CO but would consider enhanced supervision given his delirium/fall risk    Labs: TSH, b12, vit d, mag, phos, RPR, HIV, replete as necessary  EKG to monitor QTc, if >500 No antipsychotics    Medications:  Would change librium taper to ativan given elevated LFTs + Hx of HCV;  Place patient on high risk ativan taper given hx of icu admissions/withdrawal seizures + Sx triggered CIWA  Melatonin 3mg PO Q8pm  High dose thiamine 500mg TID IVP x 3-5 days  Folate supplementation  agitation: Ativan 2mg PO/IM/IV Q6H PRN  Please confirm Methadone dose in a maintenance program or, if unable, switch to Methadone detox    Dispo: No capacity to leave [place] at this time  SBIRT consult  Safety: No indication for psychiatric CO but would consider enhanced supervision given his delirium/fall risk    Labs: TSH, b12, vit d, mag, phos, RPR, HIV, replete as necessary  EKG to monitor QTc, if >500 No antipsychotics    Medications:  Would change librium taper to ativan given elevated LFTs + Hx of HCV;  Place patient on high risk ativan taper given hx of icu admissions/withdrawal seizures + Sx triggered CIWA  Melatonin 3mg PO Q8pm  High dose thiamine 500mg TID IVP x 3-5 days  Folate supplementation  agitation: Ativan 2mg PO/IM/IV Q6H PRN  Please confirm Methadone dose in a maintenance program or, if unable, switch to Methadone detox    Dispo: No capacity to leave [place] at this time  SBIRT consult  Safety: No indication for psychiatric CO but would consider enhanced supervision given his delirium/fall risk    Labs: TSH, b12, vit d, mag, phos, RPR, HIV, replete as necessary  EKG to monitor QTc, if >500 No antipsychotics    Medications:  Would change librium taper to ativan given elevated LFTs + Hx of HCV;  Place patient on high risk ativan taper given hx of icu admissions/withdrawal seizures + Sx triggered CIWA  Melatonin 3mg PO Q8pm  High dose thiamine 500mg TID IVP x 3-5 days  Folate supplementation  agitation: Ativan 2mg PO/IM/IV Q6H PRN  Please confirm Methadone dose in a maintenance program or, if unable, switch to Methadone detox    Dispo: No capacity to leave [place] at this time  SBIRT consult

## 2023-04-12 NOTE — PROGRESS NOTE ADULT - PROBLEM SELECTOR PLAN 1
h/o alcohol abuse, opioid abuse, hcv, adhd (?on dextroamp 30 mg daily), ptsd (?on escitalpram 10 mg daily)  c/f impending alcohol withdrawal syndrome  last drink reportedly 9 am 4/10/23  bac ~376, utox + for benzo  Ciwa on encounter 7, continue to monitor CIwax score   s/p valium, librium, methadone, folate, thiamine, mv in ER  neuroimaging with no acute significant pathological findings  Monitor mental status with frequent neurochecks  ciwa protocol of symptom triggered therapy with po/ivp ativan + fixed schedule taper with po librium  multivitamin, thiamine, folic acid supplementation  symptomatic relief with ppi, antiemetics, analgesics as needed  aggressive ivf resuscitation + lytes as needed; encourage po intake  fall, seizure, delirium, aspiration precaution with head end of bed elevated  psych consult for decision making capacity as patient wants to leave AMA   pt eval + sw/cm consult for disposition

## 2023-04-12 NOTE — PROGRESS NOTE ADULT - PROBLEM SELECTOR PLAN 6
hyperNa, hypok, high anion gap (lactic acidosis + elevated bac); all consistent with dehydration/hypovolemia iso alcohol intoxication  s/p 1 L NS in ER  Monitor I/Os, daily weights, UO, BUN/Cr, volume status, acid-base balance, electrolytes   follow up urine studies  trend na q4-6h until wnl; goal rate of correction ~8 meq/l/day  trend K q4-6h until wnl   trend lactate q4-6h until wnl  avoid nephrotoxic agents; appropriate dose adjustments for all renally cleared medications   encourage po intake; IVF resusci + electrolyte supplementation as needed in the mean time RESOLVED hyperNa, hypok, high anion gap (lactic acidosis + elevated bac); all consistent with dehydration/hypovolemia iso alcohol intoxication  s/p 1 L NS in ER  encourage po intake; IVF resusci + electrolyte supplementation as needed in the mean time

## 2023-04-12 NOTE — BH CONSULTATION LIAISON ASSESSMENT NOTE - ADDITIONAL PSYCHIATRIC MEDICATIONS
Reference #: [id number]    Others' Prescriptions  Patient Name: Wilbert Finch Date: 1990  Address: [address] [place] [id number]: Male  Rx Written	Rx Dispensed	Drug	Quantity	Days Supply	Prescriber Name	Prescriber Bailey #	Payment Method	Dispenser  10/13/2022	10/14/2022	dextroamp-amphetamin 30 mg tab	30	30	Vj Chowdary MD, [facility] [address]	[id number]	Medicaid	[facility] #[id number]    Patient Name: Wilbert Finch Date: 1990  Address: [address] [place] [address] [id number]: Male  Rx Written	Rx Dispensed	Drug	Quantity	Days Supply	Prescriber Name	Prescriber Bailey #	Payment Method	Dispenser  12/06/2022	12/06/2022	dextroamp-amphetamin 30 mg tab	30	30	[facility]	[id number]	Medicaid	[facility] #[id number]  05/13/2022	05/17/2022	dextroamp-amphetamin 30 mg tab	30	30	Vj Chowdary MD, [facility] [address]	[id number]	[facility] #[id number]    Patient Name: Wilbert Finch Date: 1990  Address: [address] [place] [id number]: Male  Rx Written	Rx Dispensed	Drug	Quantity	Days Supply	Prescriber Name	Prescriber Bailey #	Payment Method	Dispenser  03/15/2023	03/15/2023	lorazepam 2 mg tablet	28	14	Milagros Angeles MD	[id number]	Medicaid	[facility] #[id number]  02/07/2023	02/07/2023	dextroamp-amphetamin 30 mg tab	30	30	Vj Chowdary MD, [facility] [address]	[id number]	Medicaid	[facility] #[id number]  09/16/2022	09/16/2022	dextroamp-amphetamin 30 mg tab	30	30	Vj Chowdary MD, [facility] [address]	[id number]	Medicaid	[facility] #[id number] Reference #: [id number]    Others' Prescriptions  Patient Name: Wilbert Finch Date: 1990  Address: [address] [place] [address] [id number]: Male  Rx Written	Rx Dispensed	Drug	Quantity	Days Supply	Prescriber Name	Prescriber Bailey #	Payment Method	Dispenser  10/13/2022	10/14/2022	dextroamp-amphetamin 30 mg tab	30	30	Vj Chowdary MD, [facility]	[id number]	Medicaid	[facility] #[id number]    Patient Name: Wilbert Finch Date: 1990  Address: [address] [place] [id number]: Male  Rx Written	Rx Dispensed	Drug	Quantity	Days Supply	Prescriber Name	Prescriber Bailey #	Payment Method	Dispenser  12/06/2022	12/06/2022	dextroamp-amphetamin 30 mg tab	30	30	[facility]	[id number]	Medicaid	[facility] #[id number]  05/13/2022	05/17/2022	dextroamp-amphetamin 30 mg tab	30	30	Vj Chowdary MD, [facility]	[id number]	[facility] #[id number]    Patient Name: Wilbert Finch Date: 1990  Address: [address] [place] [id number]: Male  Rx Written	Rx Dispensed	Drug	Quantity	Days Supply	Prescriber Name	Prescriber Bailey #	Payment Method	Dispenser  03/15/2023	03/15/2023	lorazepam 2 mg tablet	28	14	Milagros Angeles MD	[id number]	Medicaid	[facility] #[id number]  02/07/2023	02/07/2023	dextroamp-amphetamin 30 mg tab	30	30	Vj Chowdary MD, [facility]	[id number]	Medicaid	[facility] #[id number]  09/16/2022	09/16/2022	dextroamp-amphetamin 30 mg tab	30	30	Vj Chowdary MD, [facility]	[id number]	Medicaid	[facility] #[id number] Reference #: [id number]    Others' Prescriptions  Patient Name: Wilbert Finch Date: 1990  Address: [address] [place] [id number]: Male  Rx Written	Rx Dispensed	Drug	Quantity	Days Supply	Prescriber Name	Prescriber Bailey #	Payment Method	Dispenser  10/13/2022	10/14/2022	dextroamp-amphetamin 30 mg tab	30	30	Vj Chowdary MD, [facility]	[id number]	Medicaid	[facility] #[id number]    Patient Name: Wilbert Finch Date: 1990  Address: [address] [place] [id number]: Male  Rx Written	Rx Dispensed	Drug	Quantity	Days Supply	Prescriber Name	Prescriber Bailey #	Payment Method	Dispenser  12/06/2022	12/06/2022	dextroamp-amphetamin 30 mg tab	30	30	[facility]	[id number]	Medicaid	[facility] #[id number]  05/13/2022	05/17/2022	dextroamp-amphetamin 30 mg tab	30	30	Vj Chowdary MD, [facility]	[id number]	[facility] #[id number]    Patient Name: Wilbert Finch Date: 1990  Address: [address] [place] [id number]: Male  Rx Written	Rx Dispensed	Drug	Quantity	Days Supply	Prescriber Name	Prescriber Bailey #	Payment Method	Dispenser  03/15/2023	03/15/2023	lorazepam 2 mg tablet	28	14	Milagros Angeles MD	[id number]	Medicaid	[facility] #[id number]  02/07/2023	02/07/2023	dextroamp-amphetamin 30 mg tab	30	30	Vj Chowdary MD, [facility]	[id number]	Medicaid	[facility] #[id number]  09/16/2022	09/16/2022	dextroamp-amphetamin 30 mg tab	30	30	Vj Chowdary MD, [facility]	[id number]	Medicaid	[facility] #[id number]

## 2023-04-12 NOTE — BH CONSULTATION LIAISON ASSESSMENT NOTE - NSICDXBHPRIMARYDX_PSY_ALL_CORE
Alcohol use disorder   F19.90   Alcohol withdrawal syndrome with complication   F10.931   Alcohol withdrawal syndrome with complication   F10.938   Alcohol withdrawal syndrome with complication   F10.933
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
## 2023-04-12 NOTE — PROGRESS NOTE ADULT - PROBLEM SELECTOR PLAN 4
mild normocytic anemia + mild thrombocytopenia, likely iso chronic alcohol abuse  ct imaging with no evidence of cirrhosis, portal htn, splenomegaly  Monitor hgb and plt w daily CBC; Goal Hb >7 g/dl,  Plt >10k, >20k if septic, >50k if actively bleeding  follow up FOBT; iron studies; Folate, Vit B12; reticulocyte count; LDH, Haptoglobin; TSH  maintain adequate PIV and active type and screen; transfuse blood product as needed to maintain goal mild normocytic anemia + mild thrombocytopenia, likely iso chronic alcohol abuse  ct imaging with no evidence of cirrhosis, portal htn, splenomegaly  Monitor hgb and plt w daily CBC; Goal Hb >7 g/dl,  Plt >10k, >20k if septic, >50k if actively bleeding  maintain adequate PIV and active type and screen; transfuse blood product as needed to maintain goal

## 2023-04-12 NOTE — SBIRT NOTE ADULT - NSSBIRTDRGNOACTINTDET_GEN_A_CORE
Patient denies drug use. However, patient reports he recieves methadone at [facility]. Social Work to remain available.  Patient denies drug use. However, patient reports he recieves methadone at [facility]. Social Work to remain available.  Patient denies drug use. However, patient reports he recieves methadone at [facility]. Social Work to remain available.

## 2023-04-12 NOTE — BH CONSULTATION LIAISON ASSESSMENT NOTE - RISK ASSESSMENT
Risk: Alcohol abuse, intoxication, medical issues, homelessness, no social support  Protective: Unknown

## 2023-04-12 NOTE — BH CONSULTATION LIAISON ASSESSMENT NOTE - NSBHATTESTCOMMENTATTENDFT_PSY_A_CORE
This is a 31 y/o M, homeless, unemployed, reported pphx of ptsd and adhd, alcohol use disorder c/b w/d seizures and icu admissions, opioid use disorder, and pmhx of hepatitis C, TBI admitted for alcohol intoxication. Psychiatry consulted for capacity to leave AMA.    I have seen and evaluated this patient myself. Chart, labs, meds reviewed. I agree with fellow's assessment and plan. Patient at this time lacks capacity to leave AMA.

## 2023-04-12 NOTE — BH CONSULTATION LIAISON ASSESSMENT NOTE - CURRENT MEDICATION
MEDICATIONS  (STANDING):  folic acid 1 milliGRAM(s) Oral daily  lactated ringers. 1000 milliLiter(s) (150 mL/Hr) IV Continuous <Continuous>  LORazepam   Injectable   IV Push   LORazepam   Injectable 2 milliGRAM(s) IV Push every 4 hours  methadone    Tablet 60 milliGRAM(s) Oral daily  multivitamin 1 Tablet(s) Oral daily  nicotine - 21 mG/24Hr(s) Patch 1 Patch Transdermal daily  pantoprazole    Tablet 40 milliGRAM(s) Oral before breakfast  thiamine 100 milliGRAM(s) Oral daily    MEDICATIONS  (PRN):  acetaminophen     Tablet .. 650 milliGRAM(s) Oral every 6 hours PRN Temp greater or equal to 38C (100.4F), Mild Pain (1 - 3)  aluminum hydroxide/magnesium hydroxide/simethicone Suspension 30 milliLiter(s) Oral every 4 hours PRN Dyspepsia  LORazepam     Tablet 2 milliGRAM(s) Oral every 2 hours PRN Symptom-triggered 2 point increase in CIWA-Ar  LORazepam   Injectable 2 milliGRAM(s) IV Push every 1 hour PRN Symptom-triggered: each CIWA -Ar score 8 or GREATER  melatonin 3 milliGRAM(s) Oral at bedtime PRN Insomnia  ondansetron Injectable 4 milliGRAM(s) IV Push every 8 hours PRN Nausea and/or Vomiting

## 2023-04-12 NOTE — PROGRESS NOTE ADULT - SUBJECTIVE AND OBJECTIVE BOX
[facility] Division of Hospital Medicine  Torsten Galvez MD  Available via MS Teams  Pager: [phone]    SUBJECTIVE / OVERNIGHT EVENTS: Patient seen and examined at bedside. Patient states he wants to leave AMA, states he does not want to be here and has "ativan" at home. Reports tremors.     ADDITIONAL REVIEW OF SYSTEMS: +tremors     MEDICATIONS  (STANDING):  chlordiazePOXIDE   Oral   chlordiazePOXIDE 50 milliGRAM(s) Oral every 8 hours  folic acid 1 milliGRAM(s) Oral daily  lactated ringers. 1000 milliLiter(s) (150 mL/Hr) IV Continuous <Continuous>  methadone    Tablet 60 milliGRAM(s) Oral daily  multivitamin 1 Tablet(s) Oral daily  nicotine - 21 mG/24Hr(s) Patch 1 Patch Transdermal daily  pantoprazole    Tablet 40 milliGRAM(s) Oral before breakfast  thiamine 100 milliGRAM(s) Oral daily    MEDICATIONS  (PRN):  acetaminophen     Tablet .. 650 milliGRAM(s) Oral every 6 hours PRN Temp greater or equal to 38C (100.4F), Mild Pain (1 - 3)  aluminum hydroxide/magnesium hydroxide/simethicone Suspension 30 milliLiter(s) Oral every 4 hours PRN Dyspepsia  LORazepam     Tablet 2 milliGRAM(s) Oral every 2 hours PRN Symptom-triggered 2 point increase in CIWA-Ar  LORazepam   Injectable 2 milliGRAM(s) IV Push every 1 hour PRN Symptom-triggered: each CIWA -Ar score 8 or GREATER  melatonin 3 milliGRAM(s) Oral at bedtime PRN Insomnia  ondansetron Injectable 4 milliGRAM(s) IV Push every 8 hours PRN Nausea and/or Vomiting      I&O's Summary    2023 07:  -  2023 07:00  --------------------------------------------------------  IN: 0 mL / OUT: 1300 mL / NET: -1300 mL    2023 07:01  -  2023 13:21  --------------------------------------------------------  IN: 240 mL / OUT: 0 mL / NET: 240 mL        PHYSICAL EXAM:  Vital Signs Last 24 Hrs  T(C): 36.7 (2023 04:39), Max: 37.2 (2023 01:56)  T(F): 98 (2023 04:39), Max: 98.9 (2023 01:56)  HR: 98 (2023 04:39) (96 - 134)  BP: 127/71 (2023 04:39) (127/71 - 146/95)  BP(mean): --  RR: 18 (2023 04:39) (18 - 18)  SpO2: 97% (2023 04:39) (96% - 98%)    Parameters below as of 2023 04:39  Patient On (Oxygen Delivery Method): room air      CONSTITUTIONAL: young male in NAD  EYES: PERRLA; conjunctiva and sclera clear  ENMT: Moist oral mucosa, no pharyngeal injection or exudates; normal dentition  NECK: Supple, no palpable masses; no thyromegaly  RESPIRATORY: Normal respiratory effort; lungs are clear to auscultation bilaterally  CARDIOVASCULAR: Regular rate and rhythm, normal S1 and S2, no murmur/rub/gallop; No lower extremity edema; Peripheral pulses are 2+ bilaterally  ABDOMEN: Nontender to palpation, normoactive bowel sounds, no rebound/guarding; No hepatosplenomegaly  MUSCULOSKELETAL:   no clubbing or cyanosis of digits; no joint swelling or tenderness to palpation  PSYCH: A+O to person, place, and time; por insight to this condition, +uncooperative   NEUROLOGY: no gross sensory deficits +tremors  SKIN: No rashes; no palpable lesions    LABS:                        9.7    2.38  )-----------( 114      ( 2023 11:01 )             29.5     04-12    137  |  98  |  <4<L>  ----------------------------<  112<H>  3.7   |  29  |  0.52    Ca    9.0      2023 11:01    TPro  6.1  /  Alb  3.9  /  TBili  0.5  /  DBili  x   /  AST  48<H>  /  ALT  34  /  AlkPhos  106  04-12      CARDIAC MARKERS ( 10 Apr 2023 16:35 )  x     / x     / 187 U/L / x     / x          Urinalysis Basic - ( 10 Apr 2023 18:33 )    Color: Light Yellow / Appearance: Clear / S.017 / pH: x  Gluc: x / Ketone: Negative  / Bili: Negative / Urobili: Negative   Blood: x / Protein: Trace / Nitrite: Negative   Leuk Esterase: Negative / RBC: x / WBC x   Sq Epi: x / Non Sq Epi: x / Bacteria: x            RADIOLOGY & ADDITIONAL TESTS:  New Results Reviewed Today:   New Imaging Personally Reviewed Today:  New Electrocardiogram Personally Reviewed Today:  Prior or Outpatient Records Reviewed Today:    COMMUNICATION:  Care Discussed with Consultants/Other Providers and Details of Discussion:  Discussions with Patient/Family:  PCP Communication:     [facility] Division of Hospital Medicine  Torsten Galvez MD  Available via MS Teams  Pager: [phone]    SUBJECTIVE / OVERNIGHT EVENTS: Patient seen and examined at bedside. Patient states he wants to leave AMA, states he does not want to be here and has "ativan" at home. Reports tremors.     ADDITIONAL REVIEW OF SYSTEMS: +tremors     MEDICATIONS  (STANDING):  chlordiazePOXIDE   Oral   chlordiazePOXIDE 50 milliGRAM(s) Oral every 8 hours  folic acid 1 milliGRAM(s) Oral daily  lactated ringers. 1000 milliLiter(s) (150 mL/Hr) IV Continuous <Continuous>  methadone    Tablet 60 milliGRAM(s) Oral daily  multivitamin 1 Tablet(s) Oral daily  nicotine - 21 mG/24Hr(s) Patch 1 Patch Transdermal daily  pantoprazole    Tablet 40 milliGRAM(s) Oral before breakfast  thiamine 100 milliGRAM(s) Oral daily    MEDICATIONS  (PRN):  acetaminophen     Tablet .. 650 milliGRAM(s) Oral every 6 hours PRN Temp greater or equal to 38C (100.4F), Mild Pain (1 - 3)  aluminum hydroxide/magnesium hydroxide/simethicone Suspension 30 milliLiter(s) Oral every 4 hours PRN Dyspepsia  LORazepam     Tablet 2 milliGRAM(s) Oral every 2 hours PRN Symptom-triggered 2 point increase in CIWA-Ar  LORazepam   Injectable 2 milliGRAM(s) IV Push every 1 hour PRN Symptom-triggered: each CIWA -Ar score 8 or GREATER  melatonin 3 milliGRAM(s) Oral at bedtime PRN Insomnia  ondansetron Injectable 4 milliGRAM(s) IV Push every 8 hours PRN Nausea and/or Vomiting      I&O's Summary    2023 07:  -  2023 07:00  --------------------------------------------------------  IN: 0 mL / OUT: 1300 mL / NET: -1300 mL    2023 07:01  -  2023 13:21  --------------------------------------------------------  IN: 240 mL / OUT: 0 mL / NET: 240 mL        PHYSICAL EXAM:  Vital Signs Last 24 Hrs  T(C): 36.7 (2023 04:39), Max: 37.2 (2023 01:56)  T(F): 98 (2023 04:39), Max: 98.9 (2023 01:56)  HR: 98 (2023 04:39) (96 - 134)  BP: 127/71 (2023 04:39) (127/71 - 146/95)  BP(mean): --  RR: 18 (2023 04:39) (18 - 18)  SpO2: 97% (2023 04:39) (96% - 98%)    Parameters below as of 2023 04:39  Patient On (Oxygen Delivery Method): room air      CONSTITUTIONAL: young male in NAD  EYES: PERRLA; conjunctiva and sclera clear  ENMT: Moist oral mucosa, no pharyngeal injection or exudates; normal dentition  NECK: Supple, no palpable masses; no thyromegaly  RESPIRATORY: Normal respiratory effort; lungs are clear to auscultation bilaterally  CARDIOVASCULAR: Regular rate and rhythm, normal S1 and S2, no murmur/rub/gallop; No lower extremity edema; Peripheral pulses are 2+ bilaterally  ABDOMEN: Nontender to palpation, normoactive bowel sounds, no rebound/guarding; No hepatosplenomegaly  MUSCULOSKELETAL:   no clubbing or cyanosis of digits; no joint swelling or tenderness to palpation  PSYCH: A+O to person, place, and time; por insight to this condition, +uncooperative   NEUROLOGY: no gross sensory deficits +tremors  SKIN: No rashes; no palpable lesions    LABS:                        9.7    2.38  )-----------( 114      ( 2023 11:01 )             29.5     04-12    137  |  98  |  <4<L>  ----------------------------<  112<H>  3.7   |  29  |  0.52    Ca    9.0      2023 11:01    TPro  6.1  /  Alb  3.9  /  TBili  0.5  /  DBili  x   /  AST  48<H>  /  ALT  34  /  AlkPhos  106  04-12      CARDIAC MARKERS ( 10 Apr 2023 16:35 )  x     / x     / 187 U/L / x     / x          Urinalysis Basic - ( 10 Apr 2023 18:33 )    Color: Light Yellow / Appearance: Clear / S.017 / pH: x  Gluc: x / Ketone: Negative  / Bili: Negative / Urobili: Negative   Blood: x / Protein: Trace / Nitrite: Negative   Leuk Esterase: Negative / RBC: x / WBC x   Sq Epi: x / Non Sq Epi: x / Bacteria: x            RADIOLOGY & ADDITIONAL TESTS:  New Results Reviewed Today:   New Imaging Personally Reviewed Today:  New Electrocardiogram Personally Reviewed Today:  Prior or Outpatient Records Reviewed Today:    COMMUNICATION:  Care Discussed with Consultants/Other Providers and Details of Discussion:  Discussions with Patient/Family:  PCP Communication:     [facility] Division of Hospital Medicine  Torsten Galvez MD  Available via MS Teams  Pager: [phone]    SUBJECTIVE / OVERNIGHT EVENTS: Patient seen and examined at bedside. Patient states he wants to leave AMA, states he does not want to be here and has "ativan" at home. Reports tremors.     ADDITIONAL REVIEW OF SYSTEMS: +tremors     MEDICATIONS  (STANDING):  chlordiazePOXIDE   Oral   chlordiazePOXIDE 50 milliGRAM(s) Oral every 8 hours  folic acid 1 milliGRAM(s) Oral daily  lactated ringers. 1000 milliLiter(s) (150 mL/Hr) IV Continuous <Continuous>  methadone    Tablet 60 milliGRAM(s) Oral daily  multivitamin 1 Tablet(s) Oral daily  nicotine - 21 mG/24Hr(s) Patch 1 Patch Transdermal daily  pantoprazole    Tablet 40 milliGRAM(s) Oral before breakfast  thiamine 100 milliGRAM(s) Oral daily    MEDICATIONS  (PRN):  acetaminophen     Tablet .. 650 milliGRAM(s) Oral every 6 hours PRN Temp greater or equal to 38C (100.4F), Mild Pain (1 - 3)  aluminum hydroxide/magnesium hydroxide/simethicone Suspension 30 milliLiter(s) Oral every 4 hours PRN Dyspepsia  LORazepam     Tablet 2 milliGRAM(s) Oral every 2 hours PRN Symptom-triggered 2 point increase in CIWA-Ar  LORazepam   Injectable 2 milliGRAM(s) IV Push every 1 hour PRN Symptom-triggered: each CIWA -Ar score 8 or GREATER  melatonin 3 milliGRAM(s) Oral at bedtime PRN Insomnia  ondansetron Injectable 4 milliGRAM(s) IV Push every 8 hours PRN Nausea and/or Vomiting      I&O's Summary    2023 07:  -  2023 07:00  --------------------------------------------------------  IN: 0 mL / OUT: 1300 mL / NET: -1300 mL    2023 07:01  -  2023 13:21  --------------------------------------------------------  IN: 240 mL / OUT: 0 mL / NET: 240 mL        PHYSICAL EXAM:  Vital Signs Last 24 Hrs  T(C): 36.7 (2023 04:39), Max: 37.2 (2023 01:56)  T(F): 98 (2023 04:39), Max: 98.9 (2023 01:56)  HR: 98 (2023 04:39) (96 - 134)  BP: 127/71 (2023 04:39) (127/71 - 146/95)  BP(mean): --  RR: 18 (2023 04:39) (18 - 18)  SpO2: 97% (2023 04:39) (96% - 98%)    Parameters below as of 2023 04:39  Patient On (Oxygen Delivery Method): room air      CONSTITUTIONAL: young male in NAD  EYES: PERRLA; conjunctiva and sclera clear  ENMT: Moist oral mucosa, no pharyngeal injection or exudates; normal dentition  NECK: Supple, no palpable masses; no thyromegaly  RESPIRATORY: Normal respiratory effort; lungs are clear to auscultation bilaterally  CARDIOVASCULAR: Regular rate and rhythm, normal S1 and S2, no murmur/rub/gallop; No lower extremity edema; Peripheral pulses are 2+ bilaterally  ABDOMEN: Nontender to palpation, normoactive bowel sounds, no rebound/guarding; No hepatosplenomegaly  MUSCULOSKELETAL:   no clubbing or cyanosis of digits; no joint swelling or tenderness to palpation  PSYCH: A+O to person, place, and time; por insight to this condition, +uncooperative   NEUROLOGY: no gross sensory deficits +tremors  SKIN: No rashes; no palpable lesions    LABS:                        9.7    2.38  )-----------( 114      ( 2023 11:01 )             29.5     04-12    137  |  98  |  <4<L>  ----------------------------<  112<H>  3.7   |  29  |  0.52    Ca    9.0      2023 11:01    TPro  6.1  /  Alb  3.9  /  TBili  0.5  /  DBili  x   /  AST  48<H>  /  ALT  34  /  AlkPhos  106  04-12      CARDIAC MARKERS ( 10 Apr 2023 16:35 )  x     / x     / 187 U/L / x     / x          Urinalysis Basic - ( 10 Apr 2023 18:33 )    Color: Light Yellow / Appearance: Clear / S.017 / pH: x  Gluc: x / Ketone: Negative  / Bili: Negative / Urobili: Negative   Blood: x / Protein: Trace / Nitrite: Negative   Leuk Esterase: Negative / RBC: x / WBC x   Sq Epi: x / Non Sq Epi: x / Bacteria: x            RADIOLOGY & ADDITIONAL TESTS:  New Results Reviewed Today:   New Imaging Personally Reviewed Today:  New Electrocardiogram Personally Reviewed Today:  Prior or Outpatient Records Reviewed Today:    COMMUNICATION:  Care Discussed with Consultants/Other Providers and Details of Discussion:  Discussions with Patient/Family:  PCP Communication:
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
## 2023-04-12 NOTE — BH CONSULTATION LIAISON ASSESSMENT NOTE - NSBHCHARTREVIEWLAB_PSY_A_CORE FT
9.7    2.38  )-----------( 114      ( 12 Apr 2023 11:01 )             29.5   04-12    137  |  98  |  <4<L>  ----------------------------<  112<H>  3.7   |  29  |  0.52    Ca    9.0      12 Apr 2023 11:01    TPro  6.1  /  Alb  3.9  /  TBili  0.5  /  DBili  x   /  AST  48<H>  /  ALT  34  /  AlkPhos  106  04-12

## 2023-04-12 NOTE — BH CONSULTATION LIAISON ASSESSMENT NOTE - NSBHCHARTREVIEWVS_PSY_A_CORE FT
Vital Signs Last 24 Hrs  T(C): 36.8 (12 Apr 2023 14:20), Max: 37.2 (12 Apr 2023 01:56)  T(F): 98.2 (12 Apr 2023 14:20), Max: 98.9 (12 Apr 2023 01:56)  HR: 105 (12 Apr 2023 14:20) (96 - 134)  BP: 149/93 (12 Apr 2023 14:20) (122/92 - 149/93)  BP(mean): --  RR: 18 (12 Apr 2023 14:20) (18 - 19)  SpO2: 97% (12 Apr 2023 14:20) (96% - 98%)    Parameters below as of 12 Apr 2023 14:20  Patient On (Oxygen Delivery Method): room air

## 2023-04-12 NOTE — PROGRESS NOTE ADULT - PROBLEM SELECTOR PLAN 5
initial ct a+p showed large distended urinary bladder + bilateral hydronephrosis  post void bladder scan showed 151 ml urine within bladder  follow up kidney + bladder us in am  monitor uo and bladder scan q4-6h in the mean time initial ct a+p showed large distended urinary bladder + bilateral hydronephrosis  post void bladder scan showed 151 ml urine within bladder  monitor uo and bladder scan q4-6h in the mean time
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
## 2023-04-12 NOTE — CHART NOTE - NSCHARTNOTEFT_GEN_A_CORE
Medicine NP note    CC: ETOH/ Agitation  Notified by RN that patient very agitated, pulled out IV.  Code gray called.  Evaluated the pt at bedside, pt sitting in bed, restless, A&oz2-3, poor cognitive insight about his condition  Follows verbal commands  CIWA score measured as 10.    Vital Signs Last 24 Hrs  T(C): 37.2 (12 Apr 2023 01:56), Max: 37.2 (12 Apr 2023 01:56)  T(F): 98.9 (12 Apr 2023 01:56), Max: 98.9 (12 Apr 2023 01:56)  HR: 100 (12 Apr 2023 01:56) (96 - 134)  BP: 127/87 (12 Apr 2023 01:56) (114/77 - 146/95)  BP(mean): --  RR: 18 (12 Apr 2023 01:56) (18 - 20)  SpO2: 97% (12 Apr 2023 01:56) (96% - 98%)    Parameters below as of 12 Apr 2023 01:56  Patient On (Oxygen Delivery Method): room air      A/P  33 yo m w pmh alcohol abuse, opioid abuse, hcv, adhd, ptsd, p/w alcohol intoxication, concern for impending alcohol withdrawal syndrome, admitted to medicine for further mgmt   Alcohol intoxication.   On librium taper and ativan PRN  New Peripheral iv inserted by author  Ativan 2mg ivp PRN dose with pt still with tremors and restlessness, ativan another 2mg ivp ordered  Pt calm now, asking for food  [place] to provide  Monitor CIWA score  Trend viTALS  C/W IV fluids  Will continue to monitor  Will reconsult ICU if pt 's CIWA rising  Will continue to monitor  Tobar ign out to day team      Alysa Rothman FNP BC  [id number] Medicine NP note    CC: ETOH/ Agitation  Notified by RN that patient very agitated, pulled out IV.  Code gray called.  Evaluated the pt at bedside, pt sitting in bed, restless, A&oz2-3, poor cognitive insight about his condition  Follows verbal commands  CIWA score measured as 10.    Vital Signs Last 24 Hrs  T(C): 37.2 (12 Apr 2023 01:56), Max: 37.2 (12 Apr 2023 01:56)  T(F): 98.9 (12 Apr 2023 01:56), Max: 98.9 (12 Apr 2023 01:56)  HR: 100 (12 Apr 2023 01:56) (96 - 134)  BP: 127/87 (12 Apr 2023 01:56) (114/77 - 146/95)  BP(mean): --  RR: 18 (12 Apr 2023 01:56) (18 - 20)  SpO2: 97% (12 Apr 2023 01:56) (96% - 98%)    Parameters below as of 12 Apr 2023 01:56  Patient On (Oxygen Delivery Method): room air      A/P  31 yo m w pmh alcohol abuse, opioid abuse, hcv, adhd, ptsd, p/w alcohol intoxication, concern for impending alcohol withdrawal syndrome, admitted to medicine for further mgmt   Alcohol intoxication.   On librium taper and ativan PRN  New Peripheral iv inserted by author  Ativan 2mg ivp PRN dose with pt still with tremors and restlessness, ativan another 2mg ivp ordered  Pt calm now, asking for food  [place] to provide  Monitor CIWA score  Trend viTALS  C/W IV fluids  Will continue to monitor  Will reconsult ICU if pt 's CIWA rising  Will continue to monitor  Tobar ign out to day team      Alysa Rothman FNP BC  [id number] Medicine NP note    CC: ETOH/ Agitation  Notified by RN that patient very agitated, pulled out IV.  Code gray called.  Evaluated the pt at bedside, pt sitting in bed, restless, A&oz2-3, poor cognitive insight about his condition  Follows verbal commands  CIWA score measured as 10.    Vital Signs Last 24 Hrs  T(C): 37.2 (12 Apr 2023 01:56), Max: 37.2 (12 Apr 2023 01:56)  T(F): 98.9 (12 Apr 2023 01:56), Max: 98.9 (12 Apr 2023 01:56)  HR: 100 (12 Apr 2023 01:56) (96 - 134)  BP: 127/87 (12 Apr 2023 01:56) (114/77 - 146/95)  BP(mean): --  RR: 18 (12 Apr 2023 01:56) (18 - 20)  SpO2: 97% (12 Apr 2023 01:56) (96% - 98%)    Parameters below as of 12 Apr 2023 01:56  Patient On (Oxygen Delivery Method): room air      A/P  31 yo m w pmh alcohol abuse, opioid abuse, hcv, adhd, ptsd, p/w alcohol intoxication, concern for impending alcohol withdrawal syndrome, admitted to medicine for further mgmt   Alcohol intoxication.   On librium taper and ativan PRN  New Peripheral iv inserted by author  Ativan 2mg ivp PRN dose with pt still with tremors and restlessness, ativan another 2mg ivp ordered  Pt calm now, asking for food  [place] to provide  Monitor CIWA score  Trend viTALS  C/W IV fluids  Will continue to monitor  Will reconsult ICU if pt 's CIWA rising  Will continue to monitor  Tobar ign out to day team      Alysa Rothman FNP BC  [id number]

## 2023-04-12 NOTE — SBIRT NOTE ADULT - NSSBIRTBRIEFINTDET_GEN_A_CORE
Social Work consulted secondary to SBIRT. LMSW explored patient's alcohol use. Patient endorses daily ETOH use. As per patient, "I drink as much alcohol as I can." SBIRT completed. Screening results were reviewed with the patient and patient was provided information about healthy guidelines and potential negative consequences associated with level of risk. Patient reports he has been to [facility] and [facility] in the past for inpatient substance use treatment. Patient does not recall when he went to those inpatient treatment centers. LMSW offered alcohol use resources to patient. Patient requesting resources for outpatient substance misuse. Social Work to remain available.

## 2023-04-12 NOTE — PROGRESS NOTE ADULT - ASSESSMENT
31 yo m w pmh alcohol abuse, opioid abuse, hcv, adhd, ptsd, p/w alcohol intoxication, concern for impending alcohol withdrawal syndrome, admitted to medicine for further mgmt  33 yo m w pmh alcohol abuse, opioid abuse, hcv, adhd, ptsd, p/w alcohol intoxication, concern for impending alcohol withdrawal syndrome, admitted to medicine for further mgmt

## 2023-04-13 PROCEDURE — 99232 SBSQ HOSP IP/OBS MODERATE 35: CPT

## 2023-04-13 PROCEDURE — 99232 SBSQ HOSP IP/OBS MODERATE 35: CPT | Mod: GC

## 2023-04-13 RX ADMIN — Medication 1 TABLET(S): at 08:43

## 2023-04-13 RX ADMIN — Medication 1 MILLIGRAM(S): at 08:43

## 2023-04-13 RX ADMIN — Medication 2 MILLIGRAM(S): at 01:28

## 2023-04-13 RX ADMIN — Medication 2 MILLIGRAM(S): at 00:55

## 2023-04-13 RX ADMIN — Medication 105 MILLIGRAM(S): at 14:43

## 2023-04-13 RX ADMIN — Medication 100 MILLIGRAM(S): at 08:43

## 2023-04-13 RX ADMIN — Medication 4 MILLIGRAM(S): at 18:12

## 2023-04-13 RX ADMIN — Medication 2 MILLIGRAM(S): at 05:25

## 2023-04-13 RX ADMIN — Medication 2 MILLIGRAM(S): at 02:01

## 2023-04-13 RX ADMIN — Medication 4 MILLIGRAM(S): at 15:08

## 2023-04-13 RX ADMIN — Medication 2 MILLIGRAM(S): at 04:42

## 2023-04-13 RX ADMIN — PANTOPRAZOLE SODIUM 40 MILLIGRAM(S): 20 TABLET, DELAYED RELEASE ORAL at 08:42

## 2023-04-13 RX ADMIN — Medication 4 MILLIGRAM(S): at 11:04

## 2023-04-13 RX ADMIN — Medication 105 MILLIGRAM(S): at 05:57

## 2023-04-13 RX ADMIN — Medication 4 MILLIGRAM(S): at 23:50

## 2023-04-13 RX ADMIN — Medication 2 MILLIGRAM(S): at 03:08

## 2023-04-13 RX ADMIN — Medication 1 PATCH: at 09:00

## 2023-04-13 RX ADMIN — Medication 1 PATCH: at 07:50

## 2023-04-13 RX ADMIN — METHADONE HYDROCHLORIDE 60 MILLIGRAM(S): 40 TABLET ORAL at 14:44

## 2023-04-13 RX ADMIN — Medication 1 PATCH: at 15:04

## 2023-04-13 RX ADMIN — Medication 2 MILLIGRAM(S): at 08:42

## 2023-04-13 NOTE — DIETITIAN INITIAL EVALUATION ADULT - PROBLEM SELECTOR PLAN 1
h/o alcohol abuse, opioid abuse, hcv, adhd (?on dextroamp 30 mg daily), ptsd (?on escitalpram 10 mg daily)  c/f impending alcohol withdrawal syndrome  last drink reportedly 9 am 4/10/23  bac ~376, utox + for benzo  Ciwa on encounter 7  s/p valium, librium, methadone, folate, thiamine, mv in ER  neuroimaging with no acute significant pathological findings  Monitor mental status with frequent neurochecks  ciwa protocol of symptom triggered therapy with po/ivp ativan + fixed schedule taper with po librium  multivitamin, thiamine, folic acid supplementation  symptomatic relief with ppi, antiemetics, analgesics as needed  aggressive ivf resuscitation + lytes as needed; encourage po intake  fall, seizure, delirium, aspiration precaution with head end of bed elevated  psych consult in am  pt eval + sw/cm consult for disposition

## 2023-04-13 NOTE — DIETITIAN INITIAL EVALUATION ADULT - ENERGY INTAKE
Pt reports poor PO intake however flowsheet 4/13 indicates %. Food preferences updated with pt, will add to his meal tray.

## 2023-04-13 NOTE — DIETITIAN INITIAL EVALUATION ADULT - ORAL INTAKE PTA/DIET HISTORY
Pt reports poor to fair PO intake PTA. He does not follow any therapeutic restriction PTA.  Confirms NKFA/intolerance  Micronutrient/Other supplementation: Thiamine, folic acid, multivitamins/minerals per H&P.   Protein-energy supplementation: none

## 2023-04-13 NOTE — PROGRESS NOTE ADULT - PROBLEM SELECTOR PLAN 4
mild normocytic anemia + mild thrombocytopenia, likely iso chronic alcohol abuse  ct imaging with no evidence of cirrhosis, portal htn, splenomegaly  Monitor hgb and plt w daily CBC; Goal Hb >7 g/dl,  Plt >10k, >20k if septic, >50k if actively bleeding  maintain adequate PIV and active type and screen; transfuse blood product as needed to maintain goal

## 2023-04-13 NOTE — DIETITIAN INITIAL EVALUATION ADULT - PERTINENT LABORATORY DATA
04-12    137  |  98  |  <4<L>  ----------------------------<  112<H>  3.7   |  29  |  0.52    Ca    9.0      12 Apr 2023 11:01    TPro  6.1  /  Alb  3.9  /  TBili  0.5  /  DBili  x   /  AST  48<H>  /  ALT  34  /  AlkPhos  106  04-12

## 2023-04-13 NOTE — BH CONSULTATION LIAISON PROGRESS NOTE - NSBHATTESTCOMMENTATTENDFT_PSY_A_CORE
This is a 33 y/o M, homeless, unemployed, reported pphx of ptsd and adhd, alcohol use disorder c/b w/d seizures and icu admissions, opioid use disorder, and pmhx of hepatitis C, TBI admitted for alcohol intoxication. Psychiatry consulted for capacity to leave AMA.    I have seen and evaluated this patient myself. Chart, labs, meds reviewed. I agree with fellow's assessment and plan. Patient at this time continues to lack capacity to leave AMA.

## 2023-04-13 NOTE — BH CONSULTATION LIAISON PROGRESS NOTE - ADDITIONAL PSYCHIATRIC MEDICATIONS
Reference #: [id number]    Others' Prescriptions  Patient Name: Wilbert Finch Date: 1990  Address: [address] [place] [id number]: Male  Rx Written	Rx Dispensed	Drug	Quantity	Days Supply	Prescriber Name	Prescriber Bailey #	Payment Method	Dispenser  10/13/2022	10/14/2022	dextroamp-amphetamin 30 mg tab	30	30	Vj Chowdary MD, [facility]	[id number]	Medicaid	[facility] #[id number]    Patient Name: Wilbert Finch Date: 1990  Address: [address] [place] [id number]: Male  Rx Written	Rx Dispensed	Drug	Quantity	Days Supply	Prescriber Name	Prescriber Bailey #	Payment Method	Dispenser  12/06/2022	12/06/2022	dextroamp-amphetamin 30 mg tab	30	30	[facility]	[id number]	Medicaid	[facility] #[id number]  05/13/2022	05/17/2022	dextroamp-amphetamin 30 mg tab	30	30	Vj Chowdary MD, [facility]	[id number]	[facility] #[id number]    Patient Name: Wilbert Finch Date: 1990  Address: [address] [place] [id number]: Male  Rx Written	Rx Dispensed	Drug	Quantity	Days Supply	Prescriber Name	Prescriber Bailey #	Payment Method	Dispenser  03/15/2023	03/15/2023	lorazepam 2 mg tablet	28	14	Milagros Angeles MD	[id number]	Medicaid	[facility] #[id number]  02/07/2023	02/07/2023	dextroamp-amphetamin 30 mg tab	30	30	Vj Chowdary MD, [facility]	[id number]	Medicaid	[facility] #[id number]  09/16/2022	09/16/2022	dextroamp-amphetamin 30 mg tab	30	30	Vj Chowdary MD, [facility]	[id number]	Medicaid	[facility] #[id number] Reference #: [id number]    Others' Prescriptions  Patient Name: Wilbert Finch Date: 1990  Address: [address] [place] [id number]: Male  Rx Written	Rx Dispensed	Drug	Quantity	Days Supply	Prescriber Name	Prescriber Bailey #	Payment Method	Dispenser  10/13/2022	10/14/2022	dextroamp-amphetamin 30 mg tab	30	30	Vj Chowdary MD, [facility]	[id number]	Medicaid	[facility] #[id number]    Patient Name: Wilbert Finch Date: 1990  Address: [address] [place] [id number]: Male  Rx Written	Rx Dispensed	Drug	Quantity	Days Supply	Prescriber Name	Prescriber Bailey #	Payment Method	Dispenser  12/06/2022	12/06/2022	dextroamp-amphetamin 30 mg tab	30	30	[facility], [facility]	[id number]	Medicaid	[facility] #[id number]  05/13/2022	05/17/2022	dextroamp-amphetamin 30 mg tab	30	30	Vj Chowdary MD, [facility]	[id number]	[facility] #[id number]    Patient Name: Wilbert Finch Date: 1990  Address: [address] [place] [id number]: Male  Rx Written	Rx Dispensed	Drug	Quantity	Days Supply	Prescriber Name	Prescriber Bailey #	Payment Method	Dispenser  03/15/2023	03/15/2023	lorazepam 2 mg tablet	28	14	Milagros Angeles MD	[id number]	Medicaid	[facility] #[id number]  02/07/2023	02/07/2023	dextroamp-amphetamin 30 mg tab	30	30	Vj Chowdary MD, [facility]	[id number]	Medicaid	[facility] #[id number]  09/16/2022	09/16/2022	dextroamp-amphetamin 30 mg tab	30	30	Vj Chowdary MD, [facility]	[id number]	Medicaid	[facility] #[id number] Reference #: [id number]    Others' Prescriptions  Patient Name: Wilbert Finch Date: 1990  Address: [address] [place] [id number]: Male  Rx Written	Rx Dispensed	Drug	Quantity	Days Supply	Prescriber Name	Prescriber Bailey #	Payment Method	Dispenser  10/13/2022	10/14/2022	dextroamp-amphetamin 30 mg tab	30	30	Vj Chowdary MD, [facility]	[id number]	Medicaid	[facility] #[id number]    Patient Name: Wilbert Finch Date: 1990  Address: [address] [place] [id number]: Male  Rx Written	Rx Dispensed	Drug	Quantity	Days Supply	Prescriber Name	Prescriber Bailey #	Payment Method	Dispenser  12/06/2022	12/06/2022	dextroamp-amphetamin 30 mg tab	30	30	[facility]	[id number]	Medicaid	[id number]  05/13/2022	05/17/2022	dextroamp-amphetamin 30 mg tab	30	30	Vj Chowdary MD, [facility]	[id number]	[facility] #[id number]    Patient Name: Wilbert Finch Date: 1990  Address: [address] [place] [phone]: Male  Rx Written	Rx Dispensed	Drug	Quantity	Days Supply	Prescriber Name	Prescriber Bailey #	Payment Method	Dispenser  03/15/2023	03/15/2023	lorazepam 2 mg tablet	28	14	Milagros Angeles MD	[id number]	Medicaid	[facility] #[id number]  02/07/2023	02/07/2023	dextroamp-amphetamin 30 mg tab	30	30	Vj Chowdary MD, [facility]	[id number]	Medicaid	[facility] #[id number]  09/16/2022	09/16/2022	dextroamp-amphetamin 30 mg tab	30	30	Vj Chowdary MD, [facility]	[id number]	Medicaid	[facility] #[id number]

## 2023-04-13 NOTE — DIETITIAN INITIAL EVALUATION ADULT - NSFNSGIASSESSMENTFT_GEN_A_CORE
Denies nausea/vomiting/constipation/diarrhea at present. Unable to recall last BM, not on any bowel regimen.

## 2023-04-13 NOTE — PROGRESS NOTE ADULT - PROBLEM SELECTOR PLAN 5
initial ct a+p showed large distended urinary bladder + bilateral hydronephrosis  post void bladder scan showed 151 ml urine within bladder  monitor uo and bladder scan q4-6h in the mean time

## 2023-04-13 NOTE — PROGRESS NOTE ADULT - PROBLEM SELECTOR PLAN 1
h/o alcohol abuse, opioid abuse, hcv, adhd (?on dextroamp 30 mg daily), ptsd (?on escitalpram 10 mg daily)  c/f impending alcohol withdrawal syndrome. Last drink reportedly 9 am 4/10/23.  Bac ~376, utox + for benzo. Ciwa on encounter 7, continue to monitor CIwax score   s/p valium, librium, methadone, folate, thiamine, mv in ER  neuroimaging with no acute significant pathological findings  Monitor mental status with frequent neurochecks  ciwa protocol of symptom triggered therapy with po/ivp ativan + fixed schedule taper with ivp librium  multivitamin, thiamine, folic acid supplementation  symptomatic relief with ppi, antiemetics, analgesics as needed  aggressive ivf resuscitation + lytes as needed; encourage po intake  fall, seizure, delirium, aspiration precaution with head end of bed elevated  pt eval + sw/cm consult for disposition  Discussed case with Psychiatry, will aim to start high taper with Librium as CIWA score elevated >8. Lacks decision making capacity.

## 2023-04-13 NOTE — DIETITIAN INITIAL EVALUATION ADULT - PERTINENT MEDS FT
MEDICATIONS  (STANDING):  folic acid 1 milliGRAM(s) Oral daily  lactated ringers. 1000 milliLiter(s) (150 mL/Hr) IV Continuous <Continuous>  LORazepam   Injectable   IV Push   LORazepam   Injectable 4 milliGRAM(s) IV Push every 4 hours  methadone    Tablet 60 milliGRAM(s) Oral daily  multivitamin 1 Tablet(s) Oral daily  nicotine - 21 mG/24Hr(s) Patch 1 Patch Transdermal daily  pantoprazole    Tablet 40 milliGRAM(s) Oral before breakfast  thiamine IVPB 500 milliGRAM(s) IV Intermittent three times a day    MEDICATIONS  (PRN):  acetaminophen     Tablet .. 650 milliGRAM(s) Oral every 6 hours PRN Temp greater or equal to 38C (100.4F), Mild Pain (1 - 3)  aluminum hydroxide/magnesium hydroxide/simethicone Suspension 30 milliLiter(s) Oral every 4 hours PRN Dyspepsia  LORazepam     Tablet 2 milliGRAM(s) Oral every 2 hours PRN Symptom-triggered 2 point increase in CIWA-Ar  LORazepam   Injectable 4 milliGRAM(s) IV Push every 1 hour PRN Symptom-triggered: each CIWA -Ar score 8 or GREATER  melatonin 3 milliGRAM(s) Oral at bedtime PRN Insomnia  ondansetron Injectable 4 milliGRAM(s) IV Push every 8 hours PRN Nausea and/or Vomiting

## 2023-04-13 NOTE — DIETITIAN INITIAL EVALUATION ADULT - REASON INDICATOR FOR ASSESSMENT
seen for consult. Information obtained from pt, electronic medical record. Pt appears agitated, requesting to leave, unable to obtain much information from pt. Brother Marcial Wallace called but he is not sure about pt's eating habit.   Chart reviewed, events noted.
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
## 2023-04-13 NOTE — DIETITIAN INITIAL EVALUATION ADULT - ADD RECOMMEND
1. Continue current diet as tolerated. RD remains available to adjust diet as needed.   2. Recommend Ensure plus high protein 240ml 2x daily (700kcal, 40g proteins) to provide additional calories and nutrients.   3. Continue micronutrient supplements (multivitamins/minerals, folic acid, thiamine) due to ETOH abuse.   4. Monitor PO intake, GI tolerance, skin integrity, labs, weight, and bowel movement regularity.   5. Honor food preferences as feasible. Assist with meals PRN and encourage PO intake.  6. Malnutrition alert placed in chart.

## 2023-04-13 NOTE — DIETITIAN INITIAL EVALUATION ADULT - WEIGHT (KG)
[address] [address] ROOM AIR AT REST 82% SAT  2 L/M NC AT REST 90% SAT  3 L/M NC EXERCISED 95% SAT 61.2

## 2023-04-13 NOTE — BH CONSULTATION LIAISON PROGRESS NOTE - NSICDXBHPRIMARYDX_PSY_ALL_CORE
Alcohol withdrawal syndrome with complication   F10.935   Alcohol withdrawal syndrome with complication   F10.93   Alcohol withdrawal syndrome with complication   F10.930

## 2023-04-13 NOTE — PROGRESS NOTE ADULT - PROBLEM SELECTOR PLAN 6
RESOLVED. hyperNa, hypok, high anion gap (lactic acidosis + elevated bac); all consistent with dehydration/hypovolemia iso alcohol intoxication  s/p 1 L NS in ER  encourage po intake; IVF resusci + electrolyte supplementation as needed in the mean time

## 2023-04-13 NOTE — DIETITIAN INITIAL EVALUATION ADULT - OTHER INFO
-- Pt is on Ativan, Methadone, Simethicone, Protonix, Zofran, Thiamine, Folic acid, multivitamins/minerals.   -- 4/11/23 folate 4.4L, on folic acid.

## 2023-04-13 NOTE — PROGRESS NOTE ADULT - PROBLEM SELECTOR PLAN 2
IMPROVING, h/o hcv Ast:alt > 2:1, ct a+p shows fatty liver, ck wnl; all likely iso alcohol intoxication. albumin and coags wnl  -f/u viral hep panel  -avoid hepatotoxic agents;   -trend lfts daily
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
## 2023-04-13 NOTE — DIETITIAN INITIAL EVALUATION ADULT - NSFNSGIIOFT_GEN_A_CORE
Ht 67" confirms with pt    UBW ~135lb (2/3 months ago)  Wt taken by RD today (standing scale): 135lb   Dosing wt: 88.5kg ? accuracy.  Will continue to monitor weight trends as available/able.     [facility] 56.7[facility] (1/13/23), 55.8kg (12/2022), 77.1kg (11/15/22), 59kg (10/30/22), 59kg (12/2/21), 64.9kg (2/6/21) Ht 67" confirms with pt    UBW ~135lb (2/3 months ago)  Wt taken by RD today (standing scale): 135lb   Dosing wt: 88.5kg ? accuracy.  Will continue to monitor weight trends as available/able.     [facility] 56.7kg (1/13/23), 55.8kg (12/2022), 77.1kg (11/15/22), 59kg (10/30/22), 59kg (12/2/21), 64.9kg (2/6/21) Ht 67" confirms with pt    UBW ~135lb (2/3 months ago)  Wt taken by RD today (standing scale): 135lb   Dosing wt: 88.5kg ? accuracy.  Will continue to monitor weight trends as available/able.     [facility] 56.7kg (1/13/23), 55.8kg (12/2022), 77.1kg (11/15/22), 59kg (10/30/22), 59kg (12/2/21), 64.9kg (2/6/21)

## 2023-04-13 NOTE — BH CONSULTATION LIAISON PROGRESS NOTE - NSBHFUPINTERVALHXFT_PSY_A_CORE
Patient placed on high risk ativan taper + sx triggered ciwa. CIWA in 20s overnight w/ periods of agitation/hallucinations. VS notable for tachycardia.  Patient seen and evaluated while on CO.   Is restless, trying to stand from bed but redirectable. Oriented to self only. States he wants to leave. Doesn't know reason for being in the hospital. Unable to articulate risks associated with untreated alcohol withdrawal ("I could have a heart attack?").   He also notes he had been drinking up to 2 pints of vodka/day for the last 2-3 years. Denies any opioid use in the last 6 months and has been on methadone maintenance since then (was on 190Qday and prior to hospital was taking 120mg Qday). Currently denying any opioid w/d sxs.   Denying any acute psychiatric safety concerns including si/hi/ahv.

## 2023-04-13 NOTE — BH CONSULTATION LIAISON PROGRESS NOTE - NSBHCONSULTRECOMMENDOTHER_PSY_A_CORE FT
Safety: No indication for psychiatric CO but would consider enhanced supervision given his delirium/fall risk    Labs: TSH, b12, vit d, mag, phos, RPR, HIV, replete as necessary  EKG to monitor QTc, if >500 No antipsychotics    Medications:  Would change librium taper to ativan given elevated LFTs + Hx of HCV;  Place patient on high risk ativan taper given hx of icu admissions/withdrawal seizures + Sx triggered CIWA --> Would increase standing regimen given elevated CIWAs, need for PRNs and elevated vitals  Melatonin 3mg PO Q8pm  High dose thiamine 500mg TID IVP x 3-5 days  Folate supplementation  agitation: Ativan 2mg PO/IM/IV Q6H PRN  Please confirm Methadone dose in a maintenance program or, if unable, switch to Methadone detox    Dispo: No capacity to leave [place] at this time  SBIRT consult  Safety: No indication for psychiatric CO but would consider enhanced supervision given his delirium/fall risk    Labs: TSH, b12, vit d, mag, phos, RPR, HIV, replete as necessary  EKG to monitor QTc, if >500 No antipsychotics    Medications:  Would change librium taper to ativan given elevated LFTs + Hx of HCV;  Place patient on high risk ativan taper given hx of icu admissions/withdrawal seizures + Sx triggered CIWA --> Would increase standing regimen given elevated CIWAs, need for PRNs and elevated vitals  Melatonin 3mg PO Q8pm  High dose thiamine 500mg TID IVP x 3-5 days  Folate supplementation  agitation: Ativan 2mg PO/IM/IV Q6H PRN  Please confirm Methadone dose in a maintenance program or, if unable, switch to Methadone detox    Dispo: No capacity to leave [place] at this time  SBIRT consult  Safety: No indication for psychiatric CO but would consider enhanced supervision given his delirium/fall risk    Labs: TSH, b12, vit d, mag, phos, RPR, HIV, replete as necessary  EKG to monitor QTc, if >500 No antipsychotics    Medications:  Would change librium taper to ativan given elevated LFTs + Hx of HCV;  Place patient on high risk ativan taper given hx of icu admissions/withdrawal seizures + Sx triggered CIWA --> Would increase standing regimen given elevated CIWAs, need for PRNs and elevated vitals  Melatonin 3mg PO Q8pm  High dose thiamine 500mg TID IVP x 3-5 days  Folate supplementation  agitation: Ativan 2mg PO/IM/IV Q6H PRN  Please confirm Methadone dose in a maintenance program or, if unable, switch to Methadone detox    Dispo: No capacity to leave [place] at this time  SBIRT consult

## 2023-04-13 NOTE — PROGRESS NOTE ADULT - SUBJECTIVE AND OBJECTIVE BOX
[facility] Division of Hospital Medicine  Torsten Galvez MD  Available via MS Teams  Pager: [phone]    SUBJECTIVE / OVERNIGHT EVENTS: Patient seen and examined at bedside. No acute overnight events. Appears agitated, tremors. Patient denies pain, still requesting to leave.     ADDITIONAL REVIEW OF SYSTEMS: +agitation + tremors     MEDICATIONS  (STANDING):  folic acid 1 milliGRAM(s) Oral daily  lactated ringers. 1000 milliLiter(s) (150 mL/Hr) IV Continuous <Continuous>  LORazepam   Injectable   IV Push   LORazepam   Injectable 4 milliGRAM(s) IV Push every 4 hours  methadone    Tablet 60 milliGRAM(s) Oral daily  multivitamin 1 Tablet(s) Oral daily  nicotine - 21 mG/24Hr(s) Patch 1 Patch Transdermal daily  pantoprazole    Tablet 40 milliGRAM(s) Oral before breakfast  thiamine IVPB 500 milliGRAM(s) IV Intermittent three times a day    MEDICATIONS  (PRN):  acetaminophen     Tablet .. 650 milliGRAM(s) Oral every 6 hours PRN Temp greater or equal to 38C (100.4F), Mild Pain (1 - 3)  aluminum hydroxide/magnesium hydroxide/simethicone Suspension 30 milliLiter(s) Oral every 4 hours PRN Dyspepsia  LORazepam     Tablet 2 milliGRAM(s) Oral every 2 hours PRN Symptom-triggered 2 point increase in CIWA-Ar  LORazepam   Injectable 4 milliGRAM(s) IV Push every 1 hour PRN Symptom-triggered: each CIWA -Ar score 8 or GREATER  melatonin 3 milliGRAM(s) Oral at bedtime PRN Insomnia  ondansetron Injectable 4 milliGRAM(s) IV Push every 8 hours PRN Nausea and/or Vomiting      I&O's Summary    12 Apr 2023 07:01  -  13 Apr 2023 07:00  --------------------------------------------------------  IN: 420 mL / OUT: 700 mL / NET: -280 mL    13 Apr 2023 07:01  -  13 Apr 2023 11:11  --------------------------------------------------------  IN: 420 mL / OUT: 0 mL / NET: 420 mL        PHYSICAL EXAM:  Vital Signs Last 24 Hrs  T(C): 36.4 (13 Apr 2023 08:41), Max: 36.8 (12 Apr 2023 14:20)  T(F): 97.6 (13 Apr 2023 08:41), Max: 98.2 (12 Apr 2023 14:20)  HR: 101 (13 Apr 2023 08:41) (101 - 117)  BP: 110/74 (13 Apr 2023 08:41) (110/74 - 149/93)  BP(mean): --  RR: 18 (13 Apr 2023 08:41) (18 - 18)  SpO2: 96% (13 Apr 2023 08:41) (95% - 99%)    Parameters below as of 13 Apr 2023 08:41  Patient On (Oxygen Delivery Method): room air      CONSTITUTIONAL: young male in NAD  EYES: PERRLA; conjunctiva and sclera clear  ENMT: Moist oral mucosa, no pharyngeal injection or exudates; normal dentition  NECK: Supple, no palpable masses; no thyromegaly  RESPIRATORY: Normal respiratory effort; lungs are clear to auscultation bilaterally  CARDIOVASCULAR: Regular rate and rhythm, normal S1 and S2, no murmur/rub/gallop; No lower extremity edema; Peripheral pulses are 2+ bilaterally  ABDOMEN: Nontender to palpation, normoactive bowel sounds, no rebound/guarding; No hepatosplenomegaly  MUSCULOSKELETAL:   no clubbing or cyanosis of digits; no joint swelling or tenderness to palpation  PSYCH: A+O to person, place, and time; por insight to this condition, +uncooperative   NEUROLOGY: no gross sensory deficits +tremors  SKIN: No rashes; no palpable lesions    LABS:                        9.7    2.38  )-----------( 114      ( 12 Apr 2023 11:01 )             29.5     04-12    137  |  98  |  <4<L>  ----------------------------<  112<H>  3.7   |  29  |  0.52    Ca    9.0      12 Apr 2023 11:01    TPro  6.1  /  Alb  3.9  /  TBili  0.5  /  DBili  x   /  AST  48<H>  /  ALT  34  /  AlkPhos  106  04-12      RADIOLOGY & ADDITIONAL TESTS:  New Results Reviewed Today:   New Imaging Personally Reviewed Today:  New Electrocardiogram Personally Reviewed Today:  Prior or Outpatient Records Reviewed Today:    COMMUNICATION:  Care Discussed with Consultants/Other Providers and Details of Discussion: Discussed case with Psychiatry, will aim to start high taper with Librium as CIWA score elevated >8. Lacks decision making capacity.   Discussions with Patient/Family:  PCP Communication:     [facility] Division of Hospital Medicine  Torsten Galvez MD  Available via MS Teams  Pager: [phone]    SUBJECTIVE / OVERNIGHT EVENTS: Patient seen and examined at bedside. No acute overnight events. Appears agitated, tremors. Patient denies pain, still requesting to leave.     ADDITIONAL REVIEW OF SYSTEMS: +agitation + tremors     MEDICATIONS  (STANDING):  folic acid 1 milliGRAM(s) Oral daily  lactated ringers. 1000 milliLiter(s) (150 mL/Hr) IV Continuous <Continuous>  LORazepam   Injectable   IV Push   LORazepam   Injectable 4 milliGRAM(s) IV Push every 4 hours  methadone    Tablet 60 milliGRAM(s) Oral daily  multivitamin 1 Tablet(s) Oral daily  nicotine - 21 mG/24Hr(s) Patch 1 Patch Transdermal daily  pantoprazole    Tablet 40 milliGRAM(s) Oral before breakfast  thiamine IVPB 500 milliGRAM(s) IV Intermittent three times a day    MEDICATIONS  (PRN):  acetaminophen     Tablet .. 650 milliGRAM(s) Oral every 6 hours PRN Temp greater or equal to 38C (100.4F), Mild Pain (1 - 3)  aluminum hydroxide/magnesium hydroxide/simethicone Suspension 30 milliLiter(s) Oral every 4 hours PRN Dyspepsia  LORazepam     Tablet 2 milliGRAM(s) Oral every 2 hours PRN Symptom-triggered 2 point increase in CIWA-Ar  LORazepam   Injectable 4 milliGRAM(s) IV Push every 1 hour PRN Symptom-triggered: each CIWA -Ar score 8 or GREATER  melatonin 3 milliGRAM(s) Oral at bedtime PRN Insomnia  ondansetron Injectable 4 milliGRAM(s) IV Push every 8 hours PRN Nausea and/or Vomiting      I&O's Summary    12 Apr 2023 07:01  -  13 Apr 2023 07:00  --------------------------------------------------------  IN: 420 mL / OUT: 700 mL / NET: -280 mL    13 Apr 2023 07:01  -  13 Apr 2023 11:11  --------------------------------------------------------  IN: 420 mL / OUT: 0 mL / NET: 420 mL        PHYSICAL EXAM:  Vital Signs Last 24 Hrs  T(C): 36.4 (13 Apr 2023 08:41), Max: 36.8 (12 Apr 2023 14:20)  T(F): 97.6 (13 Apr 2023 08:41), Max: 98.2 (12 Apr 2023 14:20)  HR: 101 (13 Apr 2023 08:41) (101 - 117)  BP: 110/74 (13 Apr 2023 08:41) (110/74 - 149/93)  BP(mean): --  RR: 18 (13 Apr 2023 08:41) (18 - 18)  SpO2: 96% (13 Apr 2023 08:41) (95% - 99%)    Parameters below as of 13 Apr 2023 08:41  Patient On (Oxygen Delivery Method): room air      CONSTITUTIONAL: young male in NAD  EYES: PERRLA; conjunctiva and sclera clear  ENMT: Moist oral mucosa, no pharyngeal injection or exudates; normal dentition  NECK: Supple, no palpable masses; no thyromegaly  RESPIRATORY: Normal respiratory effort; lungs are clear to auscultation bilaterally  CARDIOVASCULAR: Regular rate and rhythm, normal S1 and S2, no murmur/rub/gallop; No lower extremity edema; Peripheral pulses are 2+ bilaterally  ABDOMEN: Nontender to palpation, normoactive bowel sounds, no rebound/guarding; No hepatosplenomegaly  MUSCULOSKELETAL:   no clubbing or cyanosis of digits; no joint swelling or tenderness to palpation  PSYCH: A+O to person, place, and time; por insight to this condition, +uncooperative   NEUROLOGY: no gross sensory deficits +tremors  SKIN: No rashes; no palpable lesions    LABS:                        9.7    2.38  )-----------( 114      ( 12 Apr 2023 11:01 )             29.5     04-12    137  |  98  |  <4<L>  ----------------------------<  112<H>  3.7   |  29  |  0.52    Ca    9.0      12 Apr 2023 11:01    TPro  6.1  /  Alb  3.9  /  TBili  0.5  /  DBili  x   /  AST  48<H>  /  ALT  34  /  AlkPhos  106  04-12      RADIOLOGY & ADDITIONAL TESTS:  New Results Reviewed Today:   New Imaging Personally Reviewed Today:  New Electrocardiogram Personally Reviewed Today:  Prior or Outpatient Records Reviewed Today:    COMMUNICATION:  Care Discussed with Consultants/Other Providers and Details of Discussion: Discussed case with Psychiatry, will aim to start high taper with Librium as CIWA score elevated >8. Lacks decision making capacity.   Discussions with Patient/Family:  PCP Communication:     [facility] Division of Hospital Medicine  Torsten Galvez MD  Available via MS Teams  Pager: [phone]    SUBJECTIVE / OVERNIGHT EVENTS: Patient seen and examined at bedside. No acute overnight events. Appears agitated, tremors. Patient denies pain, still requesting to leave.     ADDITIONAL REVIEW OF SYSTEMS: +agitation + tremors     MEDICATIONS  (STANDING):  folic acid 1 milliGRAM(s) Oral daily  lactated ringers. 1000 milliLiter(s) (150 mL/Hr) IV Continuous <Continuous>  LORazepam   Injectable   IV Push   LORazepam   Injectable 4 milliGRAM(s) IV Push every 4 hours  methadone    Tablet 60 milliGRAM(s) Oral daily  multivitamin 1 Tablet(s) Oral daily  nicotine - 21 mG/24Hr(s) Patch 1 Patch Transdermal daily  pantoprazole    Tablet 40 milliGRAM(s) Oral before breakfast  thiamine IVPB 500 milliGRAM(s) IV Intermittent three times a day    MEDICATIONS  (PRN):  acetaminophen     Tablet .. 650 milliGRAM(s) Oral every 6 hours PRN Temp greater or equal to 38C (100.4F), Mild Pain (1 - 3)  aluminum hydroxide/magnesium hydroxide/simethicone Suspension 30 milliLiter(s) Oral every 4 hours PRN Dyspepsia  LORazepam     Tablet 2 milliGRAM(s) Oral every 2 hours PRN Symptom-triggered 2 point increase in CIWA-Ar  LORazepam   Injectable 4 milliGRAM(s) IV Push every 1 hour PRN Symptom-triggered: each CIWA -Ar score 8 or GREATER  melatonin 3 milliGRAM(s) Oral at bedtime PRN Insomnia  ondansetron Injectable 4 milliGRAM(s) IV Push every 8 hours PRN Nausea and/or Vomiting      I&O's Summary    12 Apr 2023 07:01  -  13 Apr 2023 07:00  --------------------------------------------------------  IN: 420 mL / OUT: 700 mL / NET: -280 mL    13 Apr 2023 07:01  -  13 Apr 2023 11:11  --------------------------------------------------------  IN: 420 mL / OUT: 0 mL / NET: 420 mL        PHYSICAL EXAM:  Vital Signs Last 24 Hrs  T(C): 36.4 (13 Apr 2023 08:41), Max: 36.8 (12 Apr 2023 14:20)  T(F): 97.6 (13 Apr 2023 08:41), Max: 98.2 (12 Apr 2023 14:20)  HR: 101 (13 Apr 2023 08:41) (101 - 117)  BP: 110/74 (13 Apr 2023 08:41) (110/74 - 149/93)  BP(mean): --  RR: 18 (13 Apr 2023 08:41) (18 - 18)  SpO2: 96% (13 Apr 2023 08:41) (95% - 99%)    Parameters below as of 13 Apr 2023 08:41  Patient On (Oxygen Delivery Method): room air      CONSTITUTIONAL: young male in NAD  EYES: PERRLA; conjunctiva and sclera clear  ENMT: Moist oral mucosa, no pharyngeal injection or exudates; normal dentition  NECK: Supple, no palpable masses; no thyromegaly  RESPIRATORY: Normal respiratory effort; lungs are clear to auscultation bilaterally  CARDIOVASCULAR: Regular rate and rhythm, normal S1 and S2, no murmur/rub/gallop; No lower extremity edema; Peripheral pulses are 2+ bilaterally  ABDOMEN: Nontender to palpation, normoactive bowel sounds, no rebound/guarding; No hepatosplenomegaly  MUSCULOSKELETAL:   no clubbing or cyanosis of digits; no joint swelling or tenderness to palpation  PSYCH: A+O to person, place, and time; por insight to this condition, +uncooperative   NEUROLOGY: no gross sensory deficits +tremors  SKIN: No rashes; no palpable lesions    LABS:                        9.7    2.38  )-----------( 114      ( 12 Apr 2023 11:01 )             29.5     04-12    137  |  98  |  <4<L>  ----------------------------<  112<H>  3.7   |  29  |  0.52    Ca    9.0      12 Apr 2023 11:01    TPro  6.1  /  Alb  3.9  /  TBili  0.5  /  DBili  x   /  AST  48<H>  /  ALT  34  /  AlkPhos  106  04-12      RADIOLOGY & ADDITIONAL TESTS:  New Results Reviewed Today:   New Imaging Personally Reviewed Today:  New Electrocardiogram Personally Reviewed Today:  Prior or Outpatient Records Reviewed Today:    COMMUNICATION:  Care Discussed with Consultants/Other Providers and Details of Discussion: Discussed case with Psychiatry, will aim to start high taper with Librium as CIWA score elevated >8. Lacks decision making capacity.   Discussions with Patient/Family:  PCP Communication:

## 2023-04-13 NOTE — DIETITIAN INITIAL EVALUATION ADULT - EDUCATION DIETARY MODIFICATIONS
Continue to promote PO intake at meals/snacks with consumption of oral nutrition supplements between meals./(0) unable to meet; needs instruction

## 2023-04-13 NOTE — PATIENT PROFILE ADULT - FALL HARM RISK - HARM RISK INTERVENTIONS
Assistance with ambulation/Assistance OOB with selected safe patient handling equipment/Communicate Risk of Fall with Harm to all staff/Monitor for mental status changes/Monitor gait and stability/Reinforce activity limits and safety measures with patient and family/Tailored Fall Risk Interventions/Toileting schedule using arm’s reach rule for commode and bathroom/Use of alarms - bed, chair and/or voice tab/Visual Cue: Yellow wristband and red socks/Bed in lowest position, wheels locked, appropriate side rails in place/Call bell, personal items and telephone in reach/Instruct patient to call for assistance before getting out of bed or chair/Non-slip footwear when patient is out of bed/[place] to call system/Physically safe environment - no spills, clutter or unnecessary equipment/Purposeful Proactive Rounding/Room/bathroom lighting operational, light cord in reach Assistance with ambulation/Assistance OOB with selected safe patient handling equipment/Communicate Risk of Fall with Harm to all staff/Monitor for mental status changes/Monitor gait and stability/Reinforce activity limits and safety measures with patient and family/Tailored Fall Risk Interventions/Toileting schedule using arm’s reach rule for commode and bathroom/Use of alarms - bed, chair and/or voice tab/Visual Cue: Yellow wristband and red socks/Bed in lowest position, wheels locked, appropriate side rails in place/Call bell, personal items and telephone in reach/Instruct patient to call for assistance before getting out of bed or chair/Non-slip footwear when patient is out of bed/[place] to call system/Physically safe environment - no spills, clutter or unnecessary equipment/Purposeful Proactive Rounding/Room/bathroom lighting operational, light cord in reach Assistance with ambulation/Assistance OOB with selected safe patient handling equipment/Communicate Risk of Fall with Harm to all staff/Monitor for mental status changes/Monitor gait and stability/Reinforce activity limits and safety measures with patient and family/Tailored Fall Risk Interventions/Toileting schedule using arm’s reach rule for commode and bathroom/Use of alarms - bed, chair and/or voice tab/Visual Cue: Yellow wristband and red socks/Bed in lowest position, wheels locked, appropriate side rails in place/Call bell, personal items and telephone in reach/Instruct patient to call for assistance before getting out of bed or chair/Non-slip footwear when patient is out of bed/[place] to call system/Physically safe environment - no spills, clutter or unnecessary equipment/Purposeful Proactive Rounding/Room/bathroom lighting operational, light cord in reach

## 2023-04-13 NOTE — DIETITIAN INITIAL EVALUATION ADULT - RD TO REMAIN AVAILABLE
Virginie Whittaker MS, RDN, CDN Pager #[phone] or Teams/yes Virginie Whittaker MS, RDN, CDN Pager #[phone] or Teams/yes Virginie Whittaker MS, RDN, CDN Pager #[phone] or Teams/yes

## 2023-04-14 LAB
ALBUMIN SERPL ELPH-MCNC: 3.8 G/DL — SIGNIFICANT CHANGE UP (ref 3.3–5)
ALP SERPL-CCNC: 101 U/L — SIGNIFICANT CHANGE UP (ref 40–120)
ALT FLD-CCNC: 40 U/L — SIGNIFICANT CHANGE UP (ref 10–45)
ANION GAP SERPL CALC-SCNC: 14 MMOL/L — SIGNIFICANT CHANGE UP (ref 5–17)
AST SERPL-CCNC: 83 U/L — HIGH (ref 10–40)
BILIRUB DIRECT SERPL-MCNC: <0.1 MG/DL — SIGNIFICANT CHANGE UP (ref 0–0.3)
BILIRUB INDIRECT FLD-MCNC: >0.3 MG/DL — SIGNIFICANT CHANGE UP (ref 0.2–1)
BILIRUB SERPL-MCNC: 0.4 MG/DL — SIGNIFICANT CHANGE UP (ref 0.2–1.2)
BUN SERPL-MCNC: 6 MG/DL — LOW (ref 7–23)
CALCIUM SERPL-MCNC: 9.5 MG/DL — SIGNIFICANT CHANGE UP (ref 8.4–10.5)
CHLORIDE SERPL-SCNC: 100 MMOL/L — SIGNIFICANT CHANGE UP (ref 96–108)
CO2 SERPL-SCNC: 22 MMOL/L — SIGNIFICANT CHANGE UP (ref 22–31)
CREAT SERPL-MCNC: 0.67 MG/DL — SIGNIFICANT CHANGE UP (ref 0.5–1.3)
EGFR: 126 ML/MIN/1.73M2 — SIGNIFICANT CHANGE UP
GLUCOSE SERPL-MCNC: 72 MG/DL — SIGNIFICANT CHANGE UP (ref 70–99)
MAGNESIUM SERPL-MCNC: 1.8 MG/DL — SIGNIFICANT CHANGE UP (ref 1.6–2.6)
PHOSPHATE SERPL-MCNC: 4.7 MG/DL — HIGH (ref 2.5–4.5)
POTASSIUM SERPL-MCNC: 5.1 MMOL/L — SIGNIFICANT CHANGE UP (ref 3.5–5.3)
POTASSIUM SERPL-SCNC: 5.1 MMOL/L — SIGNIFICANT CHANGE UP (ref 3.5–5.3)
PROT SERPL-MCNC: 6.5 G/DL — SIGNIFICANT CHANGE UP (ref 6–8.3)
SODIUM SERPL-SCNC: 136 MMOL/L — SIGNIFICANT CHANGE UP (ref 135–145)

## 2023-04-14 PROCEDURE — 99232 SBSQ HOSP IP/OBS MODERATE 35: CPT

## 2023-04-14 RX ADMIN — Medication 105 MILLIGRAM(S): at 21:41

## 2023-04-14 RX ADMIN — Medication 3 MILLIGRAM(S): at 15:08

## 2023-04-14 RX ADMIN — Medication 1 MILLIGRAM(S): at 09:06

## 2023-04-14 RX ADMIN — Medication 3 MILLIGRAM(S): at 17:48

## 2023-04-14 RX ADMIN — Medication 105 MILLIGRAM(S): at 15:08

## 2023-04-14 RX ADMIN — Medication 1 PATCH: at 00:08

## 2023-04-14 RX ADMIN — Medication 1 PATCH: at 19:30

## 2023-04-14 RX ADMIN — METHADONE HYDROCHLORIDE 60 MILLIGRAM(S): 40 TABLET ORAL at 12:12

## 2023-04-14 RX ADMIN — Medication 1 PATCH: at 05:52

## 2023-04-14 RX ADMIN — Medication 3 MILLIGRAM(S): at 09:06

## 2023-04-14 RX ADMIN — Medication 4 MILLIGRAM(S): at 05:32

## 2023-04-14 RX ADMIN — Medication 105 MILLIGRAM(S): at 05:33

## 2023-04-14 RX ADMIN — Medication 1 PATCH: at 09:07

## 2023-04-14 RX ADMIN — PANTOPRAZOLE SODIUM 40 MILLIGRAM(S): 20 TABLET, DELAYED RELEASE ORAL at 06:49

## 2023-04-14 RX ADMIN — Medication 4 MILLIGRAM(S): at 01:45

## 2023-04-14 RX ADMIN — Medication 3 MILLIGRAM(S): at 21:42

## 2023-04-14 RX ADMIN — Medication 1 TABLET(S): at 09:07

## 2023-04-14 NOTE — BH CONSULTATION LIAISON PROGRESS NOTE - NSBHFUPINTERVALHXFT_PSY_A_CORE
Patient placed on high risk ativan taper + sx triggered ciwa. CIWA in 20s overnight w/ periods of agitation/hallucinations. VS notable for tachycardia.  Patient seen and evaluated while on CO.   Patient is seen resting in his bed. Oriented to self only. States he wants to leave. Doesn't know reason for being in the hospital. Unable to articulate risks associated with untreated alcohol withdrawal ("I could have a heart attack?").   He also notes he had been drinking up to 2 pints of vodka/day for the last 2-3 years. Denies any opioid use in the last 6 months and has been on methadone maintenance since then (was on 190Qday and prior to hospital was taking 120mg Qday). Currently denying any opioid w/d sxs.   Denying any acute psychiatric safety concerns including si/hi/ahv.

## 2023-04-14 NOTE — PROGRESS NOTE ADULT - SUBJECTIVE AND OBJECTIVE BOX
[facility] Division of Hospital Medicine  Torsten Galvez MD  Available via MS Teams  Pager: [phone]    SUBJECTIVE / OVERNIGHT EVENTS: Patient seen and examined at bedside. Agitated overnight. Limited HPI, remains uncooperative.     ADDITIONAL REVIEW OF SYSTEMS: n/a     MEDICATIONS  (STANDING):  folic acid 1 milliGRAM(s) Oral daily  lactated ringers. 1000 milliLiter(s) (150 mL/Hr) IV Continuous <Continuous>  LORazepam   Injectable   IV Push   LORazepam   Injectable 3 milliGRAM(s) IV Push every 4 hours  methadone    Tablet 60 milliGRAM(s) Oral daily  multivitamin 1 Tablet(s) Oral daily  nicotine - 21 mG/24Hr(s) Patch 1 Patch Transdermal daily  pantoprazole    Tablet 40 milliGRAM(s) Oral before breakfast  thiamine IVPB 500 milliGRAM(s) IV Intermittent three times a day    MEDICATIONS  (PRN):  acetaminophen     Tablet .. 650 milliGRAM(s) Oral every 6 hours PRN Temp greater or equal to 38C (100.4F), Mild Pain (1 - 3)  aluminum hydroxide/magnesium hydroxide/simethicone Suspension 30 milliLiter(s) Oral every 4 hours PRN Dyspepsia  LORazepam     Tablet 2 milliGRAM(s) Oral every 2 hours PRN Symptom-triggered 2 point increase in CIWA-Ar  LORazepam   Injectable 4 milliGRAM(s) IV Push every 1 hour PRN Symptom-triggered: each CIWA -Ar score 8 or GREATER  melatonin 3 milliGRAM(s) Oral at bedtime PRN Insomnia  ondansetron Injectable 4 milliGRAM(s) IV Push every 8 hours PRN Nausea and/or Vomiting      I&O's Summary    13 Apr 2023 07:01  -  14 Apr 2023 07:00  --------------------------------------------------------  IN: 1140 mL / OUT: 1550 mL / NET: -410 mL        PHYSICAL EXAM:  Vital Signs Last 24 Hrs  T(C): 36.7 (14 Apr 2023 04:00), Max: 37.1 (13 Apr 2023 11:20)  T(F): 98.1 (14 Apr 2023 04:00), Max: 98.7 (13 Apr 2023 11:20)  HR: 90 (14 Apr 2023 04:00) (90 - 116)  BP: 108/74 (14 Apr 2023 04:00) (108/74 - 127/85)  BP(mean): 99 (13 Apr 2023 11:20) (99 - 99)  RR: 18 (14 Apr 2023 04:00) (18 - 18)  SpO2: 93% (14 Apr 2023 04:00) (93% - 97%)    Parameters below as of 14 Apr 2023 04:00  Patient On (Oxygen Delivery Method): room air      CONSTITUTIONAL: young male in NAD  EYES: PERRLA; conjunctiva and sclera clear  ENMT: Moist oral mucosa, no pharyngeal injection or exudates; normal dentition  NECK: Supple, no palpable masses; no thyromegaly  RESPIRATORY: Normal respiratory effort; lungs are clear to auscultation bilaterally  CARDIOVASCULAR: Regular rate and rhythm, normal S1 and S2, no murmur/rub/gallop; No lower extremity edema; Peripheral pulses are 2+ bilaterally  ABDOMEN: Nontender to palpation, normoactive bowel sounds, no rebound/guarding; No hepatosplenomegaly  MUSCULOSKELETAL:   no clubbing or cyanosis of digits; no joint swelling or tenderness to palpation  PSYCH: A+O to person, place, and time; por insight to this condition, +uncooperative   NEUROLOGY: no gross sensory deficits   SKIN: No rashes; no palpable lesions    LABS:    04-14    136  |  100  |  6<L>  ----------------------------<  72  5.1   |  22  |  0.67    Ca    9.5      14 Apr 2023 06:35  Phos  4.7     04-14  Mg     1.8     04-14    TPro  6.5  /  Alb  3.8  /  TBili  0.4  /  DBili  <0.1  /  AST  83<H>  /  ALT  40  /  AlkPhos  101  04-14    RADIOLOGY & ADDITIONAL TESTS:  New Results Reviewed Today:   New Imaging Personally Reviewed Today:  New Electrocardiogram Personally Reviewed Today:  Prior or Outpatient Records Reviewed Today:    COMMUNICATION:  Care Discussed with Consultants/Other Providers and Details of Discussion:  Discussions with Patient/Family:  PCP Communication:     [facility] Division of Hospital Medicine  Torsten Galvez MD  Available via MS Teams  Pager: [phone]    SUBJECTIVE / OVERNIGHT EVENTS: Patient seen and examined at bedside. Agitated overnight. Limited HPI, remains uncooperative.     ADDITIONAL REVIEW OF SYSTEMS: n/a     MEDICATIONS  (STANDING):  folic acid 1 milliGRAM(s) Oral daily  lactated ringers. 1000 milliLiter(s) (150 mL/Hr) IV Continuous <Continuous>  LORazepam   Injectable   IV Push   LORazepam   Injectable 3 milliGRAM(s) IV Push every 4 hours  methadone    Tablet 60 milliGRAM(s) Oral daily  multivitamin 1 Tablet(s) Oral daily  nicotine - 21 mG/24Hr(s) Patch 1 Patch Transdermal daily  pantoprazole    Tablet 40 milliGRAM(s) Oral before breakfast  thiamine IVPB 500 milliGRAM(s) IV Intermittent three times a day    MEDICATIONS  (PRN):  acetaminophen     Tablet .. 650 milliGRAM(s) Oral every 6 hours PRN Temp greater or equal to 38C (100.4F), Mild Pain (1 - 3)  aluminum hydroxide/magnesium hydroxide/simethicone Suspension 30 milliLiter(s) Oral every 4 hours PRN Dyspepsia  LORazepam     Tablet 2 milliGRAM(s) Oral every 2 hours PRN Symptom-triggered 2 point increase in CIWA-Ar  LORazepam   Injectable 4 milliGRAM(s) IV Push every 1 hour PRN Symptom-triggered: each CIWA -Ar score 8 or GREATER  melatonin 3 milliGRAM(s) Oral at bedtime PRN Insomnia  ondansetron Injectable 4 milliGRAM(s) IV Push every 8 hours PRN Nausea and/or Vomiting      I&O's Summary    13 Apr 2023 07:01  -  14 Apr 2023 07:00  --------------------------------------------------------  IN: 1140 mL / OUT: 1550 mL / NET: -410 mL        PHYSICAL EXAM:  Vital Signs Last 24 Hrs  T(C): 36.7 (14 Apr 2023 04:00), Max: 37.1 (13 Apr 2023 11:20)  T(F): 98.1 (14 Apr 2023 04:00), Max: 98.7 (13 Apr 2023 11:20)  HR: 90 (14 Apr 2023 04:00) (90 - 116)  BP: 108/74 (14 Apr 2023 04:00) (108/74 - 127/85)  BP(mean): 99 (13 Apr 2023 11:20) (99 - 99)  RR: 18 (14 Apr 2023 04:00) (18 - 18)  SpO2: 93% (14 Apr 2023 04:00) (93% - 97%)    Parameters below as of 14 Apr 2023 04:00  Patient On (Oxygen Delivery Method): room air      CONSTITUTIONAL: young male in NAD  EYES: PERRLA; conjunctiva and sclera clear  ENMT: Moist oral mucosa, no pharyngeal injection or exudates; normal dentition  NECK: Supple, no palpable masses; no thyromegaly  RESPIRATORY: Normal respiratory effort; lungs are clear to auscultation bilaterally  CARDIOVASCULAR: Regular rate and rhythm, normal S1 and S2, no murmur/rub/gallop; No lower extremity edema; Peripheral pulses are 2+ bilaterally  ABDOMEN: Nontender to palpation, normoactive bowel sounds, no rebound/guarding; No hepatosplenomegaly  MUSCULOSKELETAL:   no clubbing or cyanosis of digits; no joint swelling or tenderness to palpation  PSYCH: A+O to person, place, and time; por insight to this condition, +uncooperative   NEUROLOGY: no gross sensory deficits   SKIN: No rashes; no palpable lesions    LABS:    04-14    136  |  100  |  6<L>  ----------------------------<  72  5.1   |  22  |  0.67    Ca    9.5      14 Apr 2023 06:35  Phos  4.7     04-14  Mg     1.8     04-14    TPro  6.5  /  Alb  3.8  /  TBili  0.4  /  DBili  <0.1  /  AST  83<H>  /  ALT  40  /  AlkPhos  101  04-14    RADIOLOGY & ADDITIONAL TESTS:  New Results Reviewed Today:   New Imaging Personally Reviewed Today:  New Electrocardiogram Personally Reviewed Today:  Prior or Outpatient Records Reviewed Today:    COMMUNICATION:  Care Discussed with Consultants/Other Providers and Details of Discussion:  Discussions with Patient/Family:  PCP Communication:     [facility] Division of Hospital Medicine  Torsten Galvez MD  Available via MS Teams  Pager: [phone]    SUBJECTIVE / OVERNIGHT EVENTS: Patient seen and examined at bedside. Agitated overnight. Limited HPI, remains uncooperative.     ADDITIONAL REVIEW OF SYSTEMS: n/a     MEDICATIONS  (STANDING):  folic acid 1 milliGRAM(s) Oral daily  lactated ringers. 1000 milliLiter(s) (150 mL/Hr) IV Continuous <Continuous>  LORazepam   Injectable   IV Push   LORazepam   Injectable 3 milliGRAM(s) IV Push every 4 hours  methadone    Tablet 60 milliGRAM(s) Oral daily  multivitamin 1 Tablet(s) Oral daily  nicotine - 21 mG/24Hr(s) Patch 1 Patch Transdermal daily  pantoprazole    Tablet 40 milliGRAM(s) Oral before breakfast  thiamine IVPB 500 milliGRAM(s) IV Intermittent three times a day    MEDICATIONS  (PRN):  acetaminophen     Tablet .. 650 milliGRAM(s) Oral every 6 hours PRN Temp greater or equal to 38C (100.4F), Mild Pain (1 - 3)  aluminum hydroxide/magnesium hydroxide/simethicone Suspension 30 milliLiter(s) Oral every 4 hours PRN Dyspepsia  LORazepam     Tablet 2 milliGRAM(s) Oral every 2 hours PRN Symptom-triggered 2 point increase in CIWA-Ar  LORazepam   Injectable 4 milliGRAM(s) IV Push every 1 hour PRN Symptom-triggered: each CIWA -Ar score 8 or GREATER  melatonin 3 milliGRAM(s) Oral at bedtime PRN Insomnia  ondansetron Injectable 4 milliGRAM(s) IV Push every 8 hours PRN Nausea and/or Vomiting      I&O's Summary    13 Apr 2023 07:01  -  14 Apr 2023 07:00  --------------------------------------------------------  IN: 1140 mL / OUT: 1550 mL / NET: -410 mL        PHYSICAL EXAM:  Vital Signs Last 24 Hrs  T(C): 36.7 (14 Apr 2023 04:00), Max: 37.1 (13 Apr 2023 11:20)  T(F): 98.1 (14 Apr 2023 04:00), Max: 98.7 (13 Apr 2023 11:20)  HR: 90 (14 Apr 2023 04:00) (90 - 116)  BP: 108/74 (14 Apr 2023 04:00) (108/74 - 127/85)  BP(mean): 99 (13 Apr 2023 11:20) (99 - 99)  RR: 18 (14 Apr 2023 04:00) (18 - 18)  SpO2: 93% (14 Apr 2023 04:00) (93% - 97%)    Parameters below as of 14 Apr 2023 04:00  Patient On (Oxygen Delivery Method): room air      CONSTITUTIONAL: young male in NAD  EYES: PERRLA; conjunctiva and sclera clear  ENMT: Moist oral mucosa, no pharyngeal injection or exudates; normal dentition  NECK: Supple, no palpable masses; no thyromegaly  RESPIRATORY: Normal respiratory effort; lungs are clear to auscultation bilaterally  CARDIOVASCULAR: Regular rate and rhythm, normal S1 and S2, no murmur/rub/gallop; No lower extremity edema; Peripheral pulses are 2+ bilaterally  ABDOMEN: Nontender to palpation, normoactive bowel sounds, no rebound/guarding; No hepatosplenomegaly  MUSCULOSKELETAL:   no clubbing or cyanosis of digits; no joint swelling or tenderness to palpation  PSYCH: A+O to person, place, and time; por insight to this condition, +uncooperative   NEUROLOGY: no gross sensory deficits   SKIN: No rashes; no palpable lesions    LABS:    04-14    136  |  100  |  6<L>  ----------------------------<  72  5.1   |  22  |  0.67    Ca    9.5      14 Apr 2023 06:35  Phos  4.7     04-14  Mg     1.8     04-14    TPro  6.5  /  Alb  3.8  /  TBili  0.4  /  DBili  <0.1  /  AST  83<H>  /  ALT  40  /  AlkPhos  101  04-14    RADIOLOGY & ADDITIONAL TESTS:  New Results Reviewed Today:   New Imaging Personally Reviewed Today:  New Electrocardiogram Personally Reviewed Today:  Prior or Outpatient Records Reviewed Today:    COMMUNICATION:  Care Discussed with Consultants/Other Providers and Details of Discussion:  Discussions with Patient/Family:  PCP Communication:

## 2023-04-14 NOTE — BH CONSULTATION LIAISON PROGRESS NOTE - NSICDXBHPRIMARYDX_PSY_ALL_CORE
Alcohol withdrawal syndrome with complication   F10.936   Alcohol withdrawal syndrome with complication   F10.93

## 2023-04-14 NOTE — PROGRESS NOTE ADULT - PROBLEM SELECTOR PLAN 1
h/o alcohol abuse, opioid abuse, hcv, adhd (?on dextroamp 30 mg daily), ptsd (?on escitalpram 10 mg daily)  c/f impending alcohol withdrawal syndrome. Last drink reportedly 9 am 4/10/23.  Bac ~376, utox + for benzo. Ciwa on encounter 7, continue to monitor CIwax score   s/p valium, librium, methadone, folate, thiamine, mv in ER  neuroimaging with no acute significant pathological findings  Monitor mental status with frequent neurochecks  ciwa protocol of symptom triggered therapy with po/ivp ativan + fixed schedule taper with ivp librium  multivitamin, thiamine, folic acid supplementation  symptomatic relief with ppi, antiemetics, analgesics as needed  aggressive ivf resuscitation + lytes as needed; encourage po intake  fall, seizure, delirium, aspiration precaution with head end of bed elevated  pt eval + sw/cm consult for disposition  -c/w high taper with Librium, CIWA score 5, improving. Lacks decision making capacity  -Psych following

## 2023-04-14 NOTE — BH CONSULTATION LIAISON PROGRESS NOTE - NSBHCONSULTRECOMMENDOTHER_PSY_A_CORE FT
Safety: No indication for psychiatric CO but would consider enhanced supervision given his delirium/fall risk    Labs: TSH, b12, vit d, mag, phos, RPR, HIV, replete as necessary  EKG to monitor QTc, if >500 No antipsychotics    Medications:  Would change librium taper to ativan given elevated LFTs + Hx of HCV;  Place patient on high risk ativan taper given hx of icu admissions/withdrawal seizures + Sx triggered CIWA --> Would increase standing regimen given elevated CIWAs, need for PRNs and elevated vitals  Melatonin 3mg PO Q8pm  High dose thiamine 500mg TID IVP x 3-5 days  Folate supplementation  agitation: Ativan 2mg PO/IM/IV Q6H PRN  Please confirm Methadone dose in a maintenance program or, if unable, switch to Methadone detox    Dispo: No capacity to leave [place] at this time  SBIRT consult  Safety: No indication for psychiatric CO but would consider enhanced supervision given his delirium/fall risk    Labs: TSH, b12, vit d, mag, phos, RPR, HIV, replete as necessary  EKG to monitor QTc, if >500 No antipsychotics    Medications:  Would change librium taper to ativan given elevated LFTs + Hx of HCV;  Place patient on high risk ativan taper given hx of icu admissions/withdrawal seizures + Sx triggered CIWA --> Would increase standing regimen given elevated CIWAs, need for PRNs and elevated vitals  Melatonin 3mg PO Q8pm  High dose thiamine 500mg TID IVP x 3-5 days  Folate supplementation  agitation: Ativan 2mg PO/IM/IV Q6H PRN  Please confirm Methadone dose in a maintenance program or, if unable, switch to Methadone detox    Dispo: No capacity to leave [place] at this time  SBIRT consult  Safety: No indication for psychiatric CO but would consider enhanced supervision given his delirium/fall risk    Labs: TSH, b12, vit d, mag, phos, RPR, HIV, replete as necessary  EKG to monitor QTc, if >500 No antipsychotics    Medications:  Would change librium taper to ativan given elevated LFTs + Hx of HCV;  Place patient on high risk ativan taper given hx of icu admissions/withdrawal seizures + Sx triggered CIWA --> Would increase standing regimen given elevated CIWAs, need for PRNs and elevated vitals  Melatonin 3mg PO Q8pm  High dose thiamine 500mg TID IVP x 3-5 days  Folate supplementation  agitation: Ativan 2mg PO/IM/IV Q6H PRN  Please confirm Methadone dose in a maintenance program or, if unable, switch to Methadone detox    Dispo: No capacity to leave [place] at this time  SBIRT consult

## 2023-04-14 NOTE — BH CONSULTATION LIAISON PROGRESS NOTE - NSBHASSESSMENTFT_PSY_ALL_CORE
This is a 31 y/o M, homeless, unemployed, reported pphx of ptsd and adhd, alcohol use disorder c/b w/d seizures and icu admissions, opioid use disorder, and pmhx of hepatitis C, TBI admitted for alcohol intoxication. Psychiatry consulted for capacity to leave AMA.    On follow up evaluation, patient still shaky, tremulous, disoriented, ataxic. He remains on high risk ativan taper + sx triggered CIWA. Currently no capacity to leave AMA  This is a 33 y/o M, homeless, unemployed, reported pphx of ptsd and adhd, alcohol use disorder c/b w/d seizures and icu admissions, opioid use disorder, and pmhx of hepatitis C, TBI admitted for alcohol intoxication. Psychiatry consulted for capacity to leave AMA.    On follow up evaluation, patient still shaky, tremulous, disoriented, ataxic. He remains on high risk ativan taper + sx triggered CIWA. Currently no capacity to leave AMA

## 2023-04-14 NOTE — PROGRESS NOTE ADULT - ASSESSMENT
32M w pmhx alcohol abuse, opioid abuse, hcv, adhd, ptsd, p/w alcohol intoxication, concern for impending alcohol withdrawal syndrome, admitted to medicine for further mgmt

## 2023-04-14 NOTE — BH CONSULTATION LIAISON PROGRESS NOTE - ADDITIONAL PSYCHIATRIC MEDICATIONS
Reference #: [id number]    Others' Prescriptions  Patient Name: Wilbert Finch Date: 1990  Address: [address] [place] [id number]: Male  Rx Written	Rx Dispensed	Drug	Quantity	Days Supply	Prescriber Name	Prescriber Bailey #	Payment Method	Dispenser  10/13/2022	10/14/2022	dextroamp-amphetamin 30 mg tab	30	30	Vj Chowdary MD, [facility]	[id number]	Medicaid	[facility] #[id number]    Patient Name: Wilbert Finch Date: 1990  Address: [address] [place] [phone]: Male  Rx Written	Rx Dispensed	Drug	Quantity	Days Supply	Prescriber Name	Prescriber Bailey #	Payment Method	Dispenser  12/06/2022	12/06/2022	dextroamp-amphetamin 30 mg tab	30	30	[facility]	[id number]	Medicaid	[facility] #[id number]  05/13/2022	05/17/2022	dextroamp-amphetamin 30 mg tab	30	30	Vj Chowdary MD, [facility]	[id number]	[facility] #[id number]    Patient Name: Wilbert Finch Date: 1990  Address: [address] [place] [id number]: Male  Rx Written	Rx Dispensed	Drug	Quantity	Days Supply	Prescriber Name	Prescriber Bailey #	Payment Method	Dispenser  03/15/2023	03/15/2023	lorazepam 2 mg tablet	28	14	Milagros Angeles MD	[id number]	Medicaid	[facility] #[id number]  02/07/2023	02/07/2023	dextroamp-amphetamin 30 mg tab	30	30	Vj Chowdary MD, [facility]	[id number]	Medicaid	[facility] #[id number]  09/16/2022	09/16/2022	dextroamp-amphetamin 30 mg tab	30	30	Vj Chowdary MD, [facility]	[id number]	Medicaid	[facility] #[id number] Reference #: [id number]    Others' Prescriptions  Patient Name: Wilbert Finch Date: 1990  Address: [address] [place] [id number]: Male  Rx Written	Rx Dispensed	Drug	Quantity	Days Supply	Prescriber Name	Prescriber Bailey #	Payment Method	Dispenser  10/13/2022	10/14/2022	dextroamp-amphetamin 30 mg tab	30	30	Vj Chowdary MD, [facility]	[id number]	Medicaid	[facility] #[id number]    Patient Name: Wilbert Finch Date: 1990  Address: [address] [place] [id number]: Male  Rx Written	Rx Dispensed	Drug	Quantity	Days Supply	Prescriber Name	Prescriber Bailey #	Payment Method	Dispenser  12/06/2022	12/06/2022	dextroamp-amphetamin 30 mg tab	30	30	[facility]	[id number]	Medicaid	[facility] #[id number]  05/13/2022	05/17/2022	dextroamp-amphetamin 30 mg tab	30	30	Vj Chowdary MD, [facility]	[id number]	[facility] #[id number]    Patient Name: Wilbert Finch Date: 1990  Address: [address] [place] [id number]: Male  Rx Written	Rx Dispensed	Drug	Quantity	Days Supply	Prescriber Name	Prescriber Bailey #	Payment Method	Dispenser  03/15/2023	03/15/2023	lorazepam 2 mg tablet	28	14	Milagros Angeles MD	[id number]	Medicaid	[facility] #[id number]  02/07/2023	02/07/2023	dextroamp-amphetamin 30 mg tab	30	30	Vj Chowdary MD, [facility]	[id number]	Medicaid	[facility] #[id number]  09/16/2022	09/16/2022	dextroamp-amphetamin 30 mg tab	30	30	Vj Chowdary MD, [facility]	[id number]	Medicaid	[facility] #[id number] Reference #: [id number]    Others' Prescriptions  Patient Name: Wilbert Finch Date: 1990  Address: [address] [place] [id number]: Male  Rx Written	Rx Dispensed	Drug	Quantity	Days Supply	Prescriber Name	Prescriber Bailey #	Payment Method	Dispenser  10/13/2022	10/14/2022	dextroamp-amphetamin 30 mg tab	30	30	Vj Chowdary MD, [facility]	[id number]	Medicaid	[facility] #[id number]    Patient Name: Wilbert Finch Date: 1990  Address: [address] [place] [phone]: Male  Rx Written	Rx Dispensed	Drug	Quantity	Days Supply	Prescriber Name	Prescriber Bailey #	Payment Method	Dispenser  12/06/2022	12/06/2022	dextroamp-amphetamin 30 mg tab	30	30	[facility], [facility]	[id number]	Medicaid	[facility] #[id number]  05/13/2022	05/17/2022	dextroamp-amphetamin 30 mg tab	30	30	Vj Chowdary MD, [facility]	[id number]	[facility] #[id number]    Patient Name: Wilbert Finch Date: 1990  Address: [address] [place] [phone]: Male  Rx Written	Rx Dispensed	Drug	Quantity	Days Supply	Prescriber Name	Prescriber Bailey #	Payment Method	Dispenser  03/15/2023	03/15/2023	lorazepam 2 mg tablet	28	14	Milagros Angeles MD	[id number]	Medicaid	[facility] #[id number]  02/07/2023	02/07/2023	dextroamp-amphetamin 30 mg tab	30	30	Vj Chowdary MD, [facility]	[id number]	Medicaid	[facility] #[id number]  09/16/2022	09/16/2022	dextroamp-amphetamin 30 mg tab	30	30	Vj Chowdary MD, [facility]	[id number]	Medicaid	[facility] #[id number]

## 2023-04-15 LAB
ALBUMIN SERPL ELPH-MCNC: 3.6 G/DL — SIGNIFICANT CHANGE UP (ref 3.3–5)
ALP SERPL-CCNC: 94 U/L — SIGNIFICANT CHANGE UP (ref 40–120)
ALT FLD-CCNC: 37 U/L — SIGNIFICANT CHANGE UP (ref 10–45)
ANION GAP SERPL CALC-SCNC: 12 MMOL/L — SIGNIFICANT CHANGE UP (ref 5–17)
AST SERPL-CCNC: 68 U/L — HIGH (ref 10–40)
BILIRUB SERPL-MCNC: 0.3 MG/DL — SIGNIFICANT CHANGE UP (ref 0.2–1.2)
BUN SERPL-MCNC: 8 MG/DL — SIGNIFICANT CHANGE UP (ref 7–23)
CALCIUM SERPL-MCNC: 9.1 MG/DL — SIGNIFICANT CHANGE UP (ref 8.4–10.5)
CHLORIDE SERPL-SCNC: 103 MMOL/L — SIGNIFICANT CHANGE UP (ref 96–108)
CO2 SERPL-SCNC: 25 MMOL/L — SIGNIFICANT CHANGE UP (ref 22–31)
CREAT SERPL-MCNC: 0.67 MG/DL — SIGNIFICANT CHANGE UP (ref 0.5–1.3)
EGFR: 126 ML/MIN/1.73M2 — SIGNIFICANT CHANGE UP
GLUCOSE SERPL-MCNC: 103 MG/DL — HIGH (ref 70–99)
POTASSIUM SERPL-MCNC: 4.4 MMOL/L — SIGNIFICANT CHANGE UP (ref 3.5–5.3)
POTASSIUM SERPL-SCNC: 4.4 MMOL/L — SIGNIFICANT CHANGE UP (ref 3.5–5.3)
PROT SERPL-MCNC: 5.9 G/DL — LOW (ref 6–8.3)
SODIUM SERPL-SCNC: 140 MMOL/L — SIGNIFICANT CHANGE UP (ref 135–145)

## 2023-04-15 PROCEDURE — 99232 SBSQ HOSP IP/OBS MODERATE 35: CPT

## 2023-04-15 PROCEDURE — 93010 ELECTROCARDIOGRAM REPORT: CPT

## 2023-04-15 PROCEDURE — 99231 SBSQ HOSP IP/OBS SF/LOW 25: CPT

## 2023-04-15 RX ADMIN — Medication 1 PATCH: at 12:13

## 2023-04-15 RX ADMIN — Medication 1 MILLIGRAM(S): at 12:12

## 2023-04-15 RX ADMIN — Medication 1 PATCH: at 08:00

## 2023-04-15 RX ADMIN — Medication 3 MILLIGRAM(S): at 05:28

## 2023-04-15 RX ADMIN — Medication 105 MILLIGRAM(S): at 14:02

## 2023-04-15 RX ADMIN — Medication 2 MILLIGRAM(S): at 14:01

## 2023-04-15 RX ADMIN — METHADONE HYDROCHLORIDE 60 MILLIGRAM(S): 40 TABLET ORAL at 12:13

## 2023-04-15 RX ADMIN — Medication 2 MILLIGRAM(S): at 10:00

## 2023-04-15 RX ADMIN — Medication 3 MILLIGRAM(S): at 23:21

## 2023-04-15 RX ADMIN — Medication 3 MILLIGRAM(S): at 01:24

## 2023-04-15 RX ADMIN — SODIUM CHLORIDE 150 MILLILITER(S): 9 INJECTION, SOLUTION INTRAVENOUS at 23:21

## 2023-04-15 RX ADMIN — SODIUM CHLORIDE 150 MILLILITER(S): 9 INJECTION, SOLUTION INTRAVENOUS at 17:47

## 2023-04-15 RX ADMIN — SODIUM CHLORIDE 150 MILLILITER(S): 9 INJECTION, SOLUTION INTRAVENOUS at 01:29

## 2023-04-15 RX ADMIN — Medication 1 TABLET(S): at 12:13

## 2023-04-15 RX ADMIN — Medication 2 MILLIGRAM(S): at 17:47

## 2023-04-15 RX ADMIN — Medication 1 PATCH: at 19:30

## 2023-04-15 RX ADMIN — Medication 2 MILLIGRAM(S): at 21:00

## 2023-04-15 RX ADMIN — Medication 105 MILLIGRAM(S): at 05:32

## 2023-04-15 RX ADMIN — PANTOPRAZOLE SODIUM 40 MILLIGRAM(S): 20 TABLET, DELAYED RELEASE ORAL at 10:00
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
## 2023-04-15 NOTE — BH CONSULTATION LIAISON PROGRESS NOTE - NSICDXBHPRIMARYDX_PSY_ALL_CORE
Alcohol withdrawal syndrome with complication   F10.938   Alcohol withdrawal syndrome with complication   F10.932   Alcohol withdrawal syndrome with complication   F10.937

## 2023-04-15 NOTE — BH CONSULTATION LIAISON PROGRESS NOTE - ADDITIONAL PSYCHIATRIC MEDICATIONS
Reference #: [id number]    Others' Prescriptions  Patient Name: Wilbert Finch Date: 1990  Address: [address] [place] [id number]: Male  Rx Written	Rx Dispensed	Drug	Quantity	Days Supply	Prescriber Name	Prescriber Bailey #	Payment Method	Dispenser  10/13/2022	10/14/2022	dextroamp-amphetamin 30 mg tab	30	30	Vj Chowdary MD, [facility]	[id number]	Medicaid	[facility] #[id number]    Patient Name: Wilbert Finch Date: 1990  Address: [address] [place] [address] [id number]: Male  Rx Written	Rx Dispensed	Drug	Quantity	Days Supply	Prescriber Name	Prescriber Bailey #	Payment Method	Dispenser  12/06/2022	12/06/2022	dextroamp-amphetamin 30 mg tab	30	30	Vj Chowdary MD, [facility]	[id number]	Medicaid	[facility] #[id number]  05/13/2022	05/17/2022	dextroamp-amphetamin 30 mg tab	30	30	Vj Chowdary MD, [facility]	[id number]	[facility] #[id number]    Patient Name: Wilbert Finch Date: 1990  Address: [address] [place] [id number]: Male  Rx Written	Rx Dispensed	Drug	Quantity	Days Supply	Prescriber Name	Prescriber Bailey #	Payment Method	Dispenser  03/15/2023	03/15/2023	lorazepam 2 mg tablet	28	14	Milagros Angeles MD	[id number]	Medicaid	[facility] #[id number]  02/07/2023	02/07/2023	dextroamp-amphetamin 30 mg tab	30	30	Vj Chowdary MD, [facility]	[id number]	Medicaid	[facility] #[id number]  09/16/2022	09/16/2022	dextroamp-amphetamin 30 mg tab	30	30	Vj Chowdary MD, [facility]	[id number]	Medicaid	[facility] #[id number] Reference #: [id number]    Others' Prescriptions  Patient Name: Wilbert Finch Date: 1990  Address: [address] [place] [id number]: Male  Rx Written	Rx Dispensed	Drug	Quantity	Days Supply	Prescriber Name	Prescriber Bailey #	Payment Method	Dispenser  10/13/2022	10/14/2022	dextroamp-amphetamin 30 mg tab	30	30	Vj Chowdary MD, [facility]	[id number]	Medicaid	[facility] #[id number]    Patient Name: Wilbert Finch Date: 1990  Address: [address] [place] [id number]: Male  Rx Written	Rx Dispensed	Drug	Quantity	Days Supply	Prescriber Name	Prescriber Bailey #	Payment Method	Dispenser  12/06/2022	12/06/2022	dextroamp-amphetamin 30 mg tab	30	30	[facility], [facility]	[id number]	Medicaid	[facility] #[id number]  05/13/2022	05/17/2022	dextroamp-amphetamin 30 mg tab	30	30	Vj Chowdary MD, [facility]	[id number]	[facility] #[id number]    Patient Name: Wilbert Finch Date: 1990  Address: [address] [place] [id number]: Male  Rx Written	Rx Dispensed	Drug	Quantity	Days Supply	Prescriber Name	Prescriber Bailey #	Payment Method	Dispenser  03/15/2023	03/15/2023	lorazepam 2 mg tablet	28	14	Milagros Angeles MD	[id number]	Medicaid	[facility] #[id number]  02/07/2023	02/07/2023	dextroamp-amphetamin 30 mg tab	30	30	Vj Chowdary MD, [facility]	[id number]	Medicaid	[facility] #[id number]  09/16/2022	09/16/2022	dextroamp-amphetamin 30 mg tab	30	30	Vj Chowdary MD, [facility]	[id number]	Medicaid	[facility] #[id number] Reference #: [id number]    Others' Prescriptions  Patient Name: Wilbert Finch Date: 1990  Address: [address] [place] [id number]: Male  Rx Written	Rx Dispensed	Drug	Quantity	Days Supply	Prescriber Name	Prescriber Bailey #	Payment Method	Dispenser  10/13/2022	10/14/2022	dextroamp-amphetamin 30 mg tab	30	30	Vj Chowdary MD, [facility]	[id number]	Medicaid	[facility] #[id number]    Patient Name: Wilbert Finch Date: 1990  Address: [address] [place] [id number]: Male  Rx Written	Rx Dispensed	Drug	Quantity	Days Supply	Prescriber Name	Prescriber Bailey #	Payment Method	Dispenser  12/06/2022	12/06/2022	dextroamp-amphetamin 30 mg tab	30	30	[facility], [facility]	[id number]	Medicaid	[id number]  05/13/2022	05/17/2022	dextroamp-amphetamin 30 mg tab	30	30	Vj Chowdary MD, [facility]	[id number]	[facility] #[id number]    Patient Name: Wilbert Finch Date: 1990  Address: [address] [place] [id number]: Male  Rx Written	Rx Dispensed	Drug	Quantity	Days Supply	Prescriber Name	Prescriber Bailey #	Payment Method	Dispenser  03/15/2023	03/15/2023	lorazepam 2 mg tablet	28	14	Milagros Angeles MD	[id number]	Medicaid	[facility] #[id number]  02/07/2023	02/07/2023	dextroamp-amphetamin 30 mg tab	30	30	Vj Chowdary MD, [facility]	[id number]	Medicaid	[facility] #[id number]  09/16/2022	09/16/2022	dextroamp-amphetamin 30 mg tab	30	30	Vj Chowdary MD, [facility]	[id number]	Medicaid	[facility] #[id number]

## 2023-04-15 NOTE — PROGRESS NOTE ADULT - SUBJECTIVE AND OBJECTIVE BOX
[facility] Division of Hospital Medicine  Torsten Galvez MD  Available via MS Teams  Pager: [phone]    SUBJECTIVE / OVERNIGHT EVENTS: Patient seen and examined at bedside. No acute overnight events. Patient remains uncooperative, still has tremors, agitation.    ADDITIONAL REVIEW OF SYSTEMS:    MEDICATIONS  (STANDING):  folic acid 1 milliGRAM(s) Oral daily  lactated ringers. 1000 milliLiter(s) (150 mL/Hr) IV Continuous <Continuous>  LORazepam   Injectable   IV Push   LORazepam   Injectable 2 milliGRAM(s) IV Push every 4 hours  methadone    Tablet 60 milliGRAM(s) Oral daily  multivitamin 1 Tablet(s) Oral daily  nicotine - 21 mG/24Hr(s) Patch 1 Patch Transdermal daily  pantoprazole    Tablet 40 milliGRAM(s) Oral before breakfast  thiamine IVPB 500 milliGRAM(s) IV Intermittent three times a day    MEDICATIONS  (PRN):  acetaminophen     Tablet .. 650 milliGRAM(s) Oral every 6 hours PRN Temp greater or equal to 38C (100.4F), Mild Pain (1 - 3)  aluminum hydroxide/magnesium hydroxide/simethicone Suspension 30 milliLiter(s) Oral every 4 hours PRN Dyspepsia  LORazepam     Tablet 2 milliGRAM(s) Oral every 2 hours PRN Symptom-triggered 2 point increase in CIWA-Ar  LORazepam   Injectable 4 milliGRAM(s) IV Push every 1 hour PRN Symptom-triggered: each CIWA -Ar score 8 or GREATER  melatonin 3 milliGRAM(s) Oral at bedtime PRN Insomnia  ondansetron Injectable 4 milliGRAM(s) IV Push every 8 hours PRN Nausea and/or Vomiting      I&O's Summary    14 Apr 2023 07:01  -  15 Apr 2023 07:00  --------------------------------------------------------  IN: 720 mL / OUT: 0 mL / NET: 720 mL        PHYSICAL EXAM:  Vital Signs Last 24 Hrs  T(C): 37.6 (15 Apr 2023 04:20), Max: 37.6 (15 Apr 2023 04:20)  T(F): 99.6 (15 Apr 2023 04:20), Max: 99.6 (15 Apr 2023 04:20)  HR: 96 (15 Apr 2023 04:20) (86 - 98)  BP: 113/76 (15 Apr 2023 04:20) (101/64 - 113/79)  BP(mean): --  RR: 18 (15 Apr 2023 04:20) (17 - 18)  SpO2: 99% (15 Apr 2023 04:20) (97% - 99%)    Parameters below as of 15 Apr 2023 04:20  Patient On (Oxygen Delivery Method): room air    Limited Physical Exam as patient is non-complaint:     CONSTITUTIONAL: young male in NAD  MUSCULOSKELETAL:   no clubbing or cyanosis of digits; no joint swelling or tenderness to palpation  PSYCH: A+O to person, place, and time; poor insight to this condition, +uncooperative   NEUROLOGY: no gross sensory deficits, +tremors   SKIN: No rashes; no palpable lesions    LABS:    04-15    140  |  103  |  8   ----------------------------<  103<H>  4.4   |  25  |  0.67    Ca    9.1      15 Apr 2023 06:45  Phos  4.7     04-14  Mg     1.8     04-14    TPro  5.9<L>  /  Alb  3.6  /  TBili  0.3  /  DBili  x   /  AST  68<H>  /  ALT  37  /  AlkPhos  94  04-15      RADIOLOGY & ADDITIONAL TESTS:  New Results Reviewed Today:   New Imaging Personally Reviewed Today:  New Electrocardiogram Personally Reviewed Today:  Prior or Outpatient Records Reviewed Today:    COMMUNICATION:  Care Discussed with Consultants/Other Providers and Details of Discussion:  Discussions with Patient/Family:  PCP Communication:     [facility] Division of Hospital Medicine  Torsten Galvez MD  Available via MS Teams  Pager: [phone]    SUBJECTIVE / OVERNIGHT EVENTS: Patient seen and examined at bedside. No acute overnight events. Patient remains uncooperative, still has tremors, agitation.    ADDITIONAL REVIEW OF SYSTEMS:    MEDICATIONS  (STANDING):  folic acid 1 milliGRAM(s) Oral daily  lactated ringers. 1000 milliLiter(s) (150 mL/Hr) IV Continuous <Continuous>  LORazepam   Injectable   IV Push   LORazepam   Injectable 2 milliGRAM(s) IV Push every 4 hours  methadone    Tablet 60 milliGRAM(s) Oral daily  multivitamin 1 Tablet(s) Oral daily  nicotine - 21 mG/24Hr(s) Patch 1 Patch Transdermal daily  pantoprazole    Tablet 40 milliGRAM(s) Oral before breakfast  thiamine IVPB 500 milliGRAM(s) IV Intermittent three times a day    MEDICATIONS  (PRN):  acetaminophen     Tablet .. 650 milliGRAM(s) Oral every 6 hours PRN Temp greater or equal to 38C (100.4F), Mild Pain (1 - 3)  aluminum hydroxide/magnesium hydroxide/simethicone Suspension 30 milliLiter(s) Oral every 4 hours PRN Dyspepsia  LORazepam     Tablet 2 milliGRAM(s) Oral every 2 hours PRN Symptom-triggered 2 point increase in CIWA-Ar  LORazepam   Injectable 4 milliGRAM(s) IV Push every 1 hour PRN Symptom-triggered: each CIWA -Ar score 8 or GREATER  melatonin 3 milliGRAM(s) Oral at bedtime PRN Insomnia  ondansetron Injectable 4 milliGRAM(s) IV Push every 8 hours PRN Nausea and/or Vomiting      I&O's Summary    14 Apr 2023 07:01  -  15 Apr 2023 07:00  --------------------------------------------------------  IN: 720 mL / OUT: 0 mL / NET: 720 mL        PHYSICAL EXAM:  Vital Signs Last 24 Hrs  T(C): 37.6 (15 Apr 2023 04:20), Max: 37.6 (15 Apr 2023 04:20)  T(F): 99.6 (15 Apr 2023 04:20), Max: 99.6 (15 Apr 2023 04:20)  HR: 96 (15 Apr 2023 04:20) (86 - 98)  BP: 113/76 (15 Apr 2023 04:20) (101/64 - 113/79)  BP(mean): --  RR: 18 (15 Apr 2023 04:20) (17 - 18)  SpO2: 99% (15 Apr 2023 04:20) (97% - 99%)    Parameters below as of 15 Apr 2023 04:20  Patient On (Oxygen Delivery Method): room air    Limited Physical Exam as patient is non-complaint:     CONSTITUTIONAL: young male in NAD  MUSCULOSKELETAL:   no clubbing or cyanosis of digits; no joint swelling or tenderness to palpation  PSYCH: A+O to person, place, and time; poor insight to this condition, +uncooperative   NEUROLOGY: no gross sensory deficits, +tremors   SKIN: No rashes; no palpable lesions    LABS:    04-15    140  |  103  |  8   ----------------------------<  103<H>  4.4   |  25  |  0.67    Ca    9.1      15 Apr 2023 06:45  Phos  4.7     04-14  Mg     1.8     04-14    TPro  5.9<L>  /  Alb  3.6  /  TBili  0.3  /  DBili  x   /  AST  68<H>  /  ALT  37  /  AlkPhos  94  04-15      RADIOLOGY & ADDITIONAL TESTS:  New Results Reviewed Today:   New Imaging Personally Reviewed Today:  New Electrocardiogram Personally Reviewed Today:  Prior or Outpatient Records Reviewed Today:    COMMUNICATION:  Care Discussed with Consultants/Other Providers and Details of Discussion:  Discussions with Patient/Family:  PCP Communication:     [facility] Division of Hospital Medicine  Torsten Galvez MD  Available via MS Teams  Pager: [phone]    SUBJECTIVE / OVERNIGHT EVENTS: Patient seen and examined at bedside. No acute overnight events. Patient remains uncooperative, still has tremors, agitation.    ADDITIONAL REVIEW OF SYSTEMS:    MEDICATIONS  (STANDING):  folic acid 1 milliGRAM(s) Oral daily  lactated ringers. 1000 milliLiter(s) (150 mL/Hr) IV Continuous <Continuous>  LORazepam   Injectable   IV Push   LORazepam   Injectable 2 milliGRAM(s) IV Push every 4 hours  methadone    Tablet 60 milliGRAM(s) Oral daily  multivitamin 1 Tablet(s) Oral daily  nicotine - 21 mG/24Hr(s) Patch 1 Patch Transdermal daily  pantoprazole    Tablet 40 milliGRAM(s) Oral before breakfast  thiamine IVPB 500 milliGRAM(s) IV Intermittent three times a day    MEDICATIONS  (PRN):  acetaminophen     Tablet .. 650 milliGRAM(s) Oral every 6 hours PRN Temp greater or equal to 38C (100.4F), Mild Pain (1 - 3)  aluminum hydroxide/magnesium hydroxide/simethicone Suspension 30 milliLiter(s) Oral every 4 hours PRN Dyspepsia  LORazepam     Tablet 2 milliGRAM(s) Oral every 2 hours PRN Symptom-triggered 2 point increase in CIWA-Ar  LORazepam   Injectable 4 milliGRAM(s) IV Push every 1 hour PRN Symptom-triggered: each CIWA -Ar score 8 or GREATER  melatonin 3 milliGRAM(s) Oral at bedtime PRN Insomnia  ondansetron Injectable 4 milliGRAM(s) IV Push every 8 hours PRN Nausea and/or Vomiting      I&O's Summary    14 Apr 2023 07:01  -  15 Apr 2023 07:00  --------------------------------------------------------  IN: 720 mL / OUT: 0 mL / NET: 720 mL        PHYSICAL EXAM:  Vital Signs Last 24 Hrs  T(C): 37.6 (15 Apr 2023 04:20), Max: 37.6 (15 Apr 2023 04:20)  T(F): 99.6 (15 Apr 2023 04:20), Max: 99.6 (15 Apr 2023 04:20)  HR: 96 (15 Apr 2023 04:20) (86 - 98)  BP: 113/76 (15 Apr 2023 04:20) (101/64 - 113/79)  BP(mean): --  RR: 18 (15 Apr 2023 04:20) (17 - 18)  SpO2: 99% (15 Apr 2023 04:20) (97% - 99%)    Parameters below as of 15 Apr 2023 04:20  Patient On (Oxygen Delivery Method): room air    Limited Physical Exam as patient is non-complaint:     CONSTITUTIONAL: young male in NAD  MUSCULOSKELETAL:   no clubbing or cyanosis of digits; no joint swelling or tenderness to palpation  PSYCH: A+O to person, place, and time; poor insight to this condition, +uncooperative   NEUROLOGY: no gross sensory deficits, +tremors   SKIN: No rashes; no palpable lesions    LABS:    04-15    140  |  103  |  8   ----------------------------<  103<H>  4.4   |  25  |  0.67    Ca    9.1      15 Apr 2023 06:45  Phos  4.7     04-14  Mg     1.8     04-14    TPro  5.9<L>  /  Alb  3.6  /  TBili  0.3  /  DBili  x   /  AST  68<H>  /  ALT  37  /  AlkPhos  94  04-15      RADIOLOGY & ADDITIONAL TESTS:  New Results Reviewed Today:   New Imaging Personally Reviewed Today:  New Electrocardiogram Personally Reviewed Today:  Prior or Outpatient Records Reviewed Today:    COMMUNICATION:  Care Discussed with Consultants/Other Providers and Details of Discussion:  Discussions with Patient/Family:  PCP Communication:

## 2023-04-15 NOTE — BH CONSULTATION LIAISON PROGRESS NOTE - NSBHASSESSMENTFT_PSY_ALL_CORE
This is a 31 y/o M, homeless, unemployed, reported pphx of ptsd and adhd, alcohol use disorder c/b w/d seizures and icu admissions, opioid use disorder, and pmhx of hepatitis C, TBI admitted for alcohol intoxication. Psychiatry consulted for capacity to leave AMA.    On follow up evaluation, patient still shaky, tremulous, disoriented, ataxic. He remains on high risk ativan taper + sx triggered CIWA. Currently no capacity to leave AMA

## 2023-04-15 NOTE — PROGRESS NOTE ADULT - PROBLEM SELECTOR PLAN 2
IMPROVING, h/o hcv Ast:alt > 2:1, ct a+p shows fatty liver, ck wnl; all likely iso alcohol intoxication. albumin and coags wnl  -avoid hepatotoxic agents;   -trend lfts daily

## 2023-04-15 NOTE — BH CONSULTATION LIAISON PROGRESS NOTE - NSBHCHARTREVIEWINVESTIGATE_PSY_A_CORE FT
Ventricular Rate 82 BPM    Atrial Rate 82 BPM    P-R Interval 116 ms    QRS Duration 78 ms    Q-T Interval 388 ms    QTC Calculation(Bazett) 453 ms    P Axis 21 degrees    R Axis 19 degrees    T Axis -6 degrees    Diagnosis Line NORMAL SINUS RHYTHM  CANNOT RULE OUT ANTERIOR INFARCT , AGE UNDETERMINED  ABNORMAL ECG  WHEN COMPARED WITH ECG OF 10-APR-2023 20:16,  NO  SIGNIFICANT CHANGES HAVE OCCURRED  Confirmed by MD Meghan, [facility] ([id number]) on 4/15/2023 10:13:07 AM       Ventricular Rate 82 BPM    Atrial Rate 82 BPM    P-R Interval 116 ms    QRS Duration 78 ms    Q-T Interval 388 ms    QTC Calculation(Bazett) 453 ms    P Axis 21 degrees    R Axis 19 degrees    T Axis -6 degrees    Diagnosis Line NORMAL SINUS RHYTHM  CANNOT RULE OUT ANTERIOR INFARCT , AGE UNDETERMINED  ABNORMAL ECG  WHEN COMPARED WITH ECG OF 10-APR-2023 20:16,  NO  SIGNIFICANT CHANGES HAVE OCCURRED  Confirmed by MD Meghan, [facility] ([id number]) on 4/15/2023 10:13:07 AM       Ventricular Rate 82 BPM    Atrial Rate 82 BPM    P-R Interval 116 ms    QRS Duration 78 ms    Q-T Interval 388 ms    QTC Calculation(Bazett) 453 ms    P Axis 21 degrees    R Axis 19 degrees    T Axis -6 degrees    Diagnosis Line NORMAL SINUS RHYTHM  CANNOT RULE OUT ANTERIOR INFARCT , AGE UNDETERMINED  ABNORMAL ECG  WHEN COMPARED WITH ECG OF 10-APR-2023 20:16,  NO  SIGNIFICANT CHANGES HAVE OCCURRED  Confirmed by MD Meghan, [facility] ([id number]) on 4/15/2023 10:13:07 AM

## 2023-04-15 NOTE — PROGRESS NOTE ADULT - PROBLEM SELECTOR PLAN 1
h/o alcohol abuse, opioid abuse, hcv, adhd (?on dextroamp 30 mg daily), ptsd (?on escitalpram 10 mg daily)  c/f impending alcohol withdrawal syndrome. Last drink reportedly 9 am 4/10/23.  Bac ~376, utox + for benzo. Ciwa on encounter 7, continue to monitor CIwax score   s/p valium, librium, methadone, folate, thiamine, mv in ER  neuroimaging with no acute significant pathological findings  Monitor mental status with frequent Neurochecks  ciwa protocol of symptom triggered therapy with po/ivp ativan + fixed schedule taper with ivp librium  multivitamin, thiamine, folic acid supplementation  symptomatic relief with ppi, antiemetics, analgesics as needed  aggressive ivf resuscitation + lytes as needed; encourage po intake  fall, seizure, delirium, aspiration precaution with head end of bed elevated  pt eval + sw/cm consult for disposition  -c/w high taper with Librium, still reports symptoms, high risk ativan taper + sx triggered CIWA. Currently no capacity to leave AMA  -Psych following

## 2023-04-15 NOTE — BH CONSULTATION LIAISON PROGRESS NOTE - NSBHCONSULTRECOMMENDOTHER_PSY_A_CORE FT
Safety: No indication for psychiatric CO but would consider enhanced supervision given his delirium/fall risk    Labs: TSH, b12, vit d, mag, phos, RPR, HIV, replete as necessary  EKG to monitor QTc, if >500 No antipsychotics    Medications:  - high risk ativan taper given hx of icu admissions/withdrawal seizures + Sx triggered CIWA  -High dose thiamine 500mg TID IVP x 3-5 days  -c/w Folate supplementation  agitation: Ativan 2mg PO/IM/IV Q6H PRN  -Please confirm Methadone dose in a maintenance program or, if unable, switch to Methadone detox    Dispo: No capacity to leave [place] at this time  SBIRT consult  Safety: No indication for psychiatric CO but would consider enhanced supervision given his delirium/fall risk    Labs: TSH, b12, vit d, mag, phos, RPR, HIV, replete as necessary  EKG to monitor QTc, if >500 No antipsychotics    Medications:  - high risk ativan taper given hx of icu admissions/withdrawal seizures + Sx triggered CIWA  -High dose thiamine 500mg TID IVP x 3-5 days  -c/w Folate supplementation  agitation: Ativan 2mg PO/IM/IV Q6H PRN  -Please confirm Methadone dose in a maintenance program or, if unable, switch to Methadone detox    Dispo: No capacity to leave [place] at this time  SBIRT consult  Safety: No indication for psychiatric CO but would consider enhanced supervision given his delirium/fall risk    Labs: TSH, b12, vit d, mag, phos, RPR, HIV, replete as necessary  EKG to monitor QTc, if >500 No antipsychotics    Medications:  - high risk ativan taper given hx of icu admissions/withdrawal seizures + Sx triggered CIWA  -High dose thiamine 500mg TID IVP x 3-5 days  -c/w Folate supplementation  agitation: Ativan 2mg PO/IM/IV Q6H PRN  -Please confirm Methadone dose in a maintenance program or, if unable, switch to Methadone detox    Dispo: No capacity to leave [place] at this time  SBIRT consult

## 2023-04-16 PROCEDURE — 99232 SBSQ HOSP IP/OBS MODERATE 35: CPT

## 2023-04-16 RX ADMIN — Medication 2 MILLIGRAM(S): at 01:33

## 2023-04-16 RX ADMIN — Medication 1 PATCH: at 10:29

## 2023-04-16 RX ADMIN — Medication 3 MILLIGRAM(S): at 23:57

## 2023-04-16 RX ADMIN — Medication 1 MILLIGRAM(S): at 21:09

## 2023-04-16 RX ADMIN — Medication 2 MILLIGRAM(S): at 05:09

## 2023-04-16 RX ADMIN — METHADONE HYDROCHLORIDE 60 MILLIGRAM(S): 40 TABLET ORAL at 12:47

## 2023-04-16 RX ADMIN — Medication 1 MILLIGRAM(S): at 09:24

## 2023-04-16 RX ADMIN — Medication 1 TABLET(S): at 12:48

## 2023-04-16 RX ADMIN — Medication 1 PATCH: at 19:30

## 2023-04-16 RX ADMIN — PANTOPRAZOLE SODIUM 40 MILLIGRAM(S): 20 TABLET, DELAYED RELEASE ORAL at 05:09

## 2023-04-16 RX ADMIN — Medication 1 MILLIGRAM(S): at 12:48

## 2023-04-16 RX ADMIN — Medication 1 MILLIGRAM(S): at 17:53

## 2023-04-16 RX ADMIN — Medication 1 PATCH: at 14:28

## 2023-04-16 RX ADMIN — Medication 1 PATCH: at 17:52

## 2023-04-16 RX ADMIN — Medication 1 MILLIGRAM(S): at 13:58
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
## 2023-04-16 NOTE — BH CONSULTATION LIAISON PROGRESS NOTE - NSBHCHARTREVIEWINVESTIGATE_PSY_A_CORE FT
Ventricular Rate 82 BPM    Atrial Rate 82 BPM    P-R Interval 116 ms    QRS Duration 78 ms    Q-T Interval 388 ms    QTC Calculation(Bazett) 453 ms    P Axis 21 degrees    R Axis 19 degrees    T Axis -6 degrees    Diagnosis Line NORMAL SINUS RHYTHM  CANNOT RULE OUT ANTERIOR INFARCT , AGE UNDETERMINED  ABNORMAL ECG  WHEN COMPARED WITH ECG OF 10-APR-2023 20:16,  NO  SIGNIFICANT CHANGES HAVE OCCURRED  Confirmed by MD Meghan, [facility] ([id number]) on 4/15/2023 10:13:07 AM     Ventricular Rate 82 BPM    Atrial Rate 82 BPM    P-R Interval 116 ms    QRS Duration 78 ms    Q-T Interval 388 ms    QTC Calculation(Bazett) 453 ms    P Axis 21 degrees    R Axis 19 degrees    T Axis -6 degrees    Diagnosis Line NORMAL SINUS RHYTHM  CANNOT RULE OUT ANTERIOR INFARCT , AGE UNDETERMINED  ABNORMAL ECG  WHEN COMPARED WITH ECG OF 10-APR-2023 20:16,  NO  SIGNIFICANT CHANGES HAVE OCCURRED  Confirmed by MD Meghan, [facility] ([id number]) on 4/15/2023 10:13:07 AM     Ventricular Rate 82 BPM    Atrial Rate 82 BPM    P-R Interval 116 ms    QRS Duration 78 ms    Q-T Interval 388 ms    QTC Calculation(Bazett) 453 ms    P Axis 21 degrees    R Axis 19 degrees    T Axis -6 degrees    Diagnosis Line NORMAL SINUS RHYTHM  CANNOT RULE OUT ANTERIOR INFARCT , AGE UNDETERMINED  ABNORMAL ECG  WHEN COMPARED WITH ECG OF 10-APR-2023 20:16,  NO  SIGNIFICANT CHANGES HAVE OCCURRED  Confirmed by MD Meghan, [facility] ([id number]) on 4/15/2023 10:13:07 AM

## 2023-04-16 NOTE — BH CONSULTATION LIAISON PROGRESS NOTE - LEVEL OF CONSCIOUSNESS
Lethargic, arousable to verbal stimulus
Lethargic, arousable to verbal stimulus
Alert
Lethargic, arousable to verbal stimulus

## 2023-04-16 NOTE — PROGRESS NOTE ADULT - SUBJECTIVE AND OBJECTIVE BOX
[facility] Division of Hospital Medicine  Torsten Galvez MD  Available via MS Teams  Pager: [phone]    SUBJECTIVE / OVERNIGHT EVENTS: Patient seen and examined at bedside. No acute overnight events. Patient remains uncooperative.     ADDITIONAL REVIEW OF SYSTEMS: n/a     MEDICATIONS  (STANDING):  folic acid 1 milliGRAM(s) Oral daily  LORazepam   Injectable   IV Push   LORazepam   Injectable 1 milliGRAM(s) IV Push every 4 hours  methadone    Tablet 60 milliGRAM(s) Oral daily  multivitamin 1 Tablet(s) Oral daily  nicotine - 21 mG/24Hr(s) Patch 1 Patch Transdermal daily  pantoprazole    Tablet 40 milliGRAM(s) Oral before breakfast    MEDICATIONS  (PRN):  acetaminophen     Tablet .. 650 milliGRAM(s) Oral every 6 hours PRN Temp greater or equal to 38C (100.4F), Mild Pain (1 - 3)  aluminum hydroxide/magnesium hydroxide/simethicone Suspension 30 milliLiter(s) Oral every 4 hours PRN Dyspepsia  LORazepam     Tablet 2 milliGRAM(s) Oral every 2 hours PRN Symptom-triggered 2 point increase in CIWA-Ar  LORazepam   Injectable 4 milliGRAM(s) IV Push every 1 hour PRN Symptom-triggered: each CIWA -Ar score 8 or GREATER  melatonin 3 milliGRAM(s) Oral at bedtime PRN Insomnia  ondansetron Injectable 4 milliGRAM(s) IV Push every 8 hours PRN Nausea and/or Vomiting      I&O's Summary    15 Apr 2023 07:01  -  16 Apr 2023 07:00  --------------------------------------------------------  IN: 240 mL / OUT: 1900 mL / NET: -1660 mL    16 Apr 2023 07:01  -  16 Apr 2023 14:51  --------------------------------------------------------  IN: 0 mL / OUT: 400 mL / NET: -400 mL        PHYSICAL EXAM:  Vital Signs Last 24 Hrs  T(C): 36.8 (16 Apr 2023 13:48), Max: 36.8 (16 Apr 2023 13:48)  T(F): 98.2 (16 Apr 2023 13:48), Max: 98.2 (16 Apr 2023 13:48)  HR: 83 (16 Apr 2023 13:48) (82 - 98)  BP: 114/80 (16 Apr 2023 13:48) (107/71 - 119/79)  BP(mean): 96 (16 Apr 2023 09:16) (71 - 96)  RR: 18 (16 Apr 2023 13:48) (18 - 18)  SpO2: 94% (16 Apr 2023 13:48) (94% - 99%)    Parameters below as of 16 Apr 2023 13:48  Patient On (Oxygen Delivery Method): room air      Limited Physical Exam as patient is non-complaint:     CONSTITUTIONAL: young male in NAD  MUSCULOSKELETAL:   no clubbing or cyanosis of digits; no joint swelling or tenderness to palpation  PSYCH: A+O to person, place, and time; poor insight to this condition, +uncooperative   NEUROLOGY: no gross sensory deficits, +tremors   SKIN: No rashes; no palpable lesions    LABS:    04-15    140  |  103  |  8   ----------------------------<  103<H>  4.4   |  25  |  0.67    Ca    9.1      15 Apr 2023 06:45    TPro  5.9<L>  /  Alb  3.6  /  TBili  0.3  /  DBili  x   /  AST  68<H>  /  ALT  37  /  AlkPhos  94  04-15      RADIOLOGY & ADDITIONAL TESTS:  New Results Reviewed Today:   New Imaging Personally Reviewed Today:  New Electrocardiogram Personally Reviewed Today:  Prior or Outpatient Records Reviewed Today:    COMMUNICATION:  Care Discussed with Consultants/Other Providers and Details of Discussion:  Discussions with Patient/Family:  PCP Communication:     [facility] Division of Hospital Medicine  Torsten Galvez MD  Available via MS Teams  Pager: [phone]    SUBJECTIVE / OVERNIGHT EVENTS: Patient seen and examined at bedside. No acute overnight events. Patient remains uncooperative.     ADDITIONAL REVIEW OF SYSTEMS: n/a     MEDICATIONS  (STANDING):  folic acid 1 milliGRAM(s) Oral daily  LORazepam   Injectable   IV Push   LORazepam   Injectable 1 milliGRAM(s) IV Push every 4 hours  methadone    Tablet 60 milliGRAM(s) Oral daily  multivitamin 1 Tablet(s) Oral daily  nicotine - 21 mG/24Hr(s) Patch 1 Patch Transdermal daily  pantoprazole    Tablet 40 milliGRAM(s) Oral before breakfast    MEDICATIONS  (PRN):  acetaminophen     Tablet .. 650 milliGRAM(s) Oral every 6 hours PRN Temp greater or equal to 38C (100.4F), Mild Pain (1 - 3)  aluminum hydroxide/magnesium hydroxide/simethicone Suspension 30 milliLiter(s) Oral every 4 hours PRN Dyspepsia  LORazepam     Tablet 2 milliGRAM(s) Oral every 2 hours PRN Symptom-triggered 2 point increase in CIWA-Ar  LORazepam   Injectable 4 milliGRAM(s) IV Push every 1 hour PRN Symptom-triggered: each CIWA -Ar score 8 or GREATER  melatonin 3 milliGRAM(s) Oral at bedtime PRN Insomnia  ondansetron Injectable 4 milliGRAM(s) IV Push every 8 hours PRN Nausea and/or Vomiting      I&O's Summary    15 Apr 2023 07:01  -  16 Apr 2023 07:00  --------------------------------------------------------  IN: 240 mL / OUT: 1900 mL / NET: -1660 mL    16 Apr 2023 07:01  -  16 Apr 2023 14:51  --------------------------------------------------------  IN: 0 mL / OUT: 400 mL / NET: -400 mL        PHYSICAL EXAM:  Vital Signs Last 24 Hrs  T(C): 36.8 (16 Apr 2023 13:48), Max: 36.8 (16 Apr 2023 13:48)  T(F): 98.2 (16 Apr 2023 13:48), Max: 98.2 (16 Apr 2023 13:48)  HR: 83 (16 Apr 2023 13:48) (82 - 98)  BP: 114/80 (16 Apr 2023 13:48) (107/71 - 119/79)  BP(mean): 96 (16 Apr 2023 09:16) (71 - 96)  RR: 18 (16 Apr 2023 13:48) (18 - 18)  SpO2: 94% (16 Apr 2023 13:48) (94% - 99%)    Parameters below as of 16 Apr 2023 13:48  Patient On (Oxygen Delivery Method): room air      Limited Physical Exam as patient is non-complaint:     CONSTITUTIONAL: young male in NAD  MUSCULOSKELETAL:   no clubbing or cyanosis of digits; no joint swelling or tenderness to palpation  PSYCH: A+O to person, place, and time; poor insight to this condition, +uncooperative   NEUROLOGY: no gross sensory deficits, +tremors   SKIN: No rashes; no palpable lesions    LABS:    04-15    140  |  103  |  8   ----------------------------<  103<H>  4.4   |  25  |  0.67    Ca    9.1      15 Apr 2023 06:45    TPro  5.9<L>  /  Alb  3.6  /  TBili  0.3  /  DBili  x   /  AST  68<H>  /  ALT  37  /  AlkPhos  94  04-15      RADIOLOGY & ADDITIONAL TESTS:  New Results Reviewed Today:   New Imaging Personally Reviewed Today:  New Electrocardiogram Personally Reviewed Today:  Prior or Outpatient Records Reviewed Today:    COMMUNICATION:  Care Discussed with Consultants/Other Providers and Details of Discussion:  Discussions with Patient/Family:  PCP Communication:     [facility] Division of Hospital Medicine  Torsten Galvez MD  Available via MS Teams  Pager: [phone]    SUBJECTIVE / OVERNIGHT EVENTS: Patient seen and examined at bedside. No acute overnight events. Patient remains uncooperative.     ADDITIONAL REVIEW OF SYSTEMS: n/a     MEDICATIONS  (STANDING):  folic acid 1 milliGRAM(s) Oral daily  LORazepam   Injectable   IV Push   LORazepam   Injectable 1 milliGRAM(s) IV Push every 4 hours  methadone    Tablet 60 milliGRAM(s) Oral daily  multivitamin 1 Tablet(s) Oral daily  nicotine - 21 mG/24Hr(s) Patch 1 Patch Transdermal daily  pantoprazole    Tablet 40 milliGRAM(s) Oral before breakfast    MEDICATIONS  (PRN):  acetaminophen     Tablet .. 650 milliGRAM(s) Oral every 6 hours PRN Temp greater or equal to 38C (100.4F), Mild Pain (1 - 3)  aluminum hydroxide/magnesium hydroxide/simethicone Suspension 30 milliLiter(s) Oral every 4 hours PRN Dyspepsia  LORazepam     Tablet 2 milliGRAM(s) Oral every 2 hours PRN Symptom-triggered 2 point increase in CIWA-Ar  LORazepam   Injectable 4 milliGRAM(s) IV Push every 1 hour PRN Symptom-triggered: each CIWA -Ar score 8 or GREATER  melatonin 3 milliGRAM(s) Oral at bedtime PRN Insomnia  ondansetron Injectable 4 milliGRAM(s) IV Push every 8 hours PRN Nausea and/or Vomiting      I&O's Summary    15 Apr 2023 07:01  -  16 Apr 2023 07:00  --------------------------------------------------------  IN: 240 mL / OUT: 1900 mL / NET: -1660 mL    16 Apr 2023 07:01  -  16 Apr 2023 14:51  --------------------------------------------------------  IN: 0 mL / OUT: 400 mL / NET: -400 mL        PHYSICAL EXAM:  Vital Signs Last 24 Hrs  T(C): 36.8 (16 Apr 2023 13:48), Max: 36.8 (16 Apr 2023 13:48)  T(F): 98.2 (16 Apr 2023 13:48), Max: 98.2 (16 Apr 2023 13:48)  HR: 83 (16 Apr 2023 13:48) (82 - 98)  BP: 114/80 (16 Apr 2023 13:48) (107/71 - 119/79)  BP(mean): 96 (16 Apr 2023 09:16) (71 - 96)  RR: 18 (16 Apr 2023 13:48) (18 - 18)  SpO2: 94% (16 Apr 2023 13:48) (94% - 99%)    Parameters below as of 16 Apr 2023 13:48  Patient On (Oxygen Delivery Method): room air      Limited Physical Exam as patient is non-complaint:     CONSTITUTIONAL: young male in NAD  MUSCULOSKELETAL:   no clubbing or cyanosis of digits; no joint swelling or tenderness to palpation  PSYCH: A+O to person, place, and time; poor insight to this condition, +uncooperative   NEUROLOGY: no gross sensory deficits, +tremors   SKIN: No rashes; no palpable lesions    LABS:    04-15    140  |  103  |  8   ----------------------------<  103<H>  4.4   |  25  |  0.67    Ca    9.1      15 Apr 2023 06:45    TPro  5.9<L>  /  Alb  3.6  /  TBili  0.3  /  DBili  x   /  AST  68<H>  /  ALT  37  /  AlkPhos  94  04-15      RADIOLOGY & ADDITIONAL TESTS:  New Results Reviewed Today:   New Imaging Personally Reviewed Today:  New Electrocardiogram Personally Reviewed Today:  Prior or Outpatient Records Reviewed Today:    COMMUNICATION:  Care Discussed with Consultants/Other Providers and Details of Discussion:  Discussions with Patient/Family:  PCP Communication:

## 2023-04-16 NOTE — BH CONSULTATION LIAISON PROGRESS NOTE - NSBHCHARTREVIEWVS_PSY_A_CORE FT
Vital Signs Last 24 Hrs  T(C): 36.5 (16 Apr 2023 09:16), Max: 36.9 (15 Apr 2023 12:37)  T(F): 97.7 (16 Apr 2023 09:16), Max: 98.4 (15 Apr 2023 12:37)  HR: 96 (16 Apr 2023 09:16) (82 - 98)  BP: 117/81 (16 Apr 2023 09:16) (100/65 - 119/79)  BP(mean): 96 (16 Apr 2023 09:16) (71 - 96)  RR: 18 (16 Apr 2023 09:16) (18 - 19)  SpO2: 99% (16 Apr 2023 09:16) (94% - 99%)    Parameters below as of 16 Apr 2023 09:16  Patient On (Oxygen Delivery Method): room air    
Vital Signs Last 24 Hrs  T(C): 37.1 (13 Apr 2023 11:20), Max: 37.1 (13 Apr 2023 11:20)  T(F): 98.7 (13 Apr 2023 11:20), Max: 98.7 (13 Apr 2023 11:20)  HR: 103 (13 Apr 2023 11:20) (101 - 117)  BP: 123/88 (13 Apr 2023 11:20) (110/74 - 149/93)  BP(mean): 99 (13 Apr 2023 11:20) (99 - 99)  RR: 18 (13 Apr 2023 11:20) (18 - 18)  SpO2: 97% (13 Apr 2023 11:20) (95% - 99%)    Parameters below as of 13 Apr 2023 11:20  Patient On (Oxygen Delivery Method): room air    
Vital Signs Last 24 Hrs  T(C): 36.9 (14 Apr 2023 11:35), Max: 36.9 (14 Apr 2023 03:15)  T(F): 98.5 (14 Apr 2023 11:35), Max: 98.5 (14 Apr 2023 11:35)  HR: 97 (14 Apr 2023 11:35) (90 - 98)  BP: 101/64 (14 Apr 2023 11:35) (101/64 - 119/82)  BP(mean): --  RR: 18 (14 Apr 2023 11:35) (18 - 18)  SpO2: 98% (14 Apr 2023 11:35) (93% - 98%)    Parameters below as of 14 Apr 2023 11:35  Patient On (Oxygen Delivery Method): room air    
Vital Signs Last 24 Hrs  T(C): 36.9 (15 Apr 2023 12:37), Max: 37.6 (15 Apr 2023 04:20)  T(F): 98.4 (15 Apr 2023 12:37), Max: 99.6 (15 Apr 2023 04:20)  HR: 96 (15 Apr 2023 04:20) (86 - 98)  BP: 100/65 (15 Apr 2023 12:37) (100/65 - 113/79)  BP(mean): --  RR: 19 (15 Apr 2023 12:37) (17 - 19)  SpO2: 97% (15 Apr 2023 12:37) (97% - 99%)    Parameters below as of 15 Apr 2023 12:37  Patient On (Oxygen Delivery Method): room air

## 2023-04-16 NOTE — BH CONSULTATION LIAISON PROGRESS NOTE - NSBHCHARTREVIEWLAB_PSY_A_CORE FT
04-15    140  |  103  |  8   ----------------------------<  103<H>  4.4   |  25  |  0.67    Ca    9.1      15 Apr 2023 06:45    TPro  5.9<L>  /  Alb  3.6  /  TBili  0.3  /  DBili  x   /  AST  68<H>  /  ALT  37  /  AlkPhos  94  04-15  
  04-14    136  |  100  |  6<L>  ----------------------------<  72  5.1   |  22  |  0.67    Ca    9.5      14 Apr 2023 06:35  Phos  4.7     04-14  Mg     1.8     04-14    TPro  6.5  /  Alb  3.8  /  TBili  0.4  /  DBili  <0.1  /  AST  83<H>  /  ALT  40  /  AlkPhos  101  04-14    
                      9.7    2.38  )-----------( 114      ( 12 Apr 2023 11:01 )             29.5   04-12    137  |  98  |  <4<L>  ----------------------------<  112<H>  3.7   |  29  |  0.52    Ca    9.0      12 Apr 2023 11:01    TPro  6.1  /  Alb  3.9  /  TBili  0.5  /  DBili  x   /  AST  48<H>  /  ALT  34  /  AlkPhos  106  04-12  
  04-15    140  |  103  |  8   ----------------------------<  103<H>  4.4   |  25  |  0.67    Ca    9.1      15 Apr 2023 06:45  Phos  4.7     04-14  Mg     1.8     04-14    TPro  5.9<L>  /  Alb  3.6  /  TBili  0.3  /  DBili  x   /  AST  68<H>  /  ALT  37  /  AlkPhos  94  04-15

## 2023-04-16 NOTE — PROGRESS NOTE ADULT - PROBLEM SELECTOR PLAN 1
h/o alcohol abuse, opioid abuse, hcv, adhd (?on dextroamp 30 mg daily), ptsd (?on escitalpram 10 mg daily)  c/f impending alcohol withdrawal syndrome. Last drink reportedly 9 am 4/10/23.  Bac ~376, utox + for benzo. Ciwa on encounter 7, continue to monitor CIwax score   s/p valium, librium, methadone, folate, thiamine, mv in ER  neuroimaging with no acute significant pathological findings  Monitor mental status with frequent Neurochecks  ciwa protocol of symptom triggered therapy with po/ivp ativan + fixed schedule taper with ivp librium  multivitamin, thiamine, folic acid supplementation  symptomatic relief with ppi, antiemetics, analgesics as needed  aggressive ivf resuscitation + lytes as needed; encourage po intake  fall, seizure, delirium, aspiration precaution with head end of bed elevated  pt eval + sw/cm consult for disposition  -c/w high taper with Librium, still reports symptoms, high risk ativan taper + sx triggered CIWA. Currently no capacity to leave AMA. Librium taper to end 4/18   -Psych following

## 2023-04-16 NOTE — BH CONSULTATION LIAISON PROGRESS NOTE - ADDITIONAL PSYCHIATRIC MEDICATIONS
Reference #: [id number]    Others' Prescriptions  Patient Name: Wilbert Finch Date: 1990  Address: [address] [place] [address] [id number]: Male  Rx Written	Rx Dispensed	Drug	Quantity	Days Supply	Prescriber Name	Prescriber Bailey #	Payment Method	Dispenser  10/13/2022	10/14/2022	dextroamp-amphetamin 30 mg tab	30	30	Vj Chowdary MD, [facility]	[id number]	Medicaid	[facility] #[id number]    Patient Name: Wilbert Finch Date: 1990  Address: [address] [place] [id number]: Male  Rx Written	Rx Dispensed	Drug	Quantity	Days Supply	Prescriber Name	Prescriber Bailey #	Payment Method	Dispenser  12/06/2022	12/06/2022	dextroamp-amphetamin 30 mg tab	30	30	Vj Chowdary MD, [facility]	[id number]	Medicaid	[facility] #[id number]  05/13/2022	05/17/2022	dextroamp-amphetamin 30 mg tab	30	30	Vj Chowdary MD, [facility]	[id number]	[facility] #[id number]    Patient Name: Wilbert Finch Date: 1990  Address: [address] [place] [address] [id number]: Male  Rx Written	Rx Dispensed	Drug	Quantity	Days Supply	Prescriber Name	Prescriber Bailey #	Payment Method	Dispenser  03/15/2023	03/15/2023	lorazepam 2 mg tablet	28	14	Milagros Angeles MD	[id number]	Medicaid	[facility] #[id number]  02/07/2023	02/07/2023	dextroamp-amphetamin 30 mg tab	30	30	Vj Chowdary MD, [facility]	[id number]	Medicaid	[facility] #[id number]  09/16/2022	09/16/2022	dextroamp-amphetamin 30 mg tab	30	30	Vj Chowdary MD, [facility]	[id number]	Medicaid	[facility] #[id number] Reference #: [id number]    Others' Prescriptions  Patient Name: Wilbert Finch Date: 1990  Address: [address] [place] [id number]: Male  Rx Written	Rx Dispensed	Drug	Quantity	Days Supply	Prescriber Name	Prescriber Bailey #	Payment Method	Dispenser  10/13/2022	10/14/2022	dextroamp-amphetamin 30 mg tab	30	30	Vj Chowdary MD, [facility]	[id number]	Medicaid	[facility] #[id number]    Patient Name: Wilbert Finch Date: 1990  Address: [address] [place] [id number]: Male  Rx Written	Rx Dispensed	Drug	Quantity	Days Supply	Prescriber Name	Prescriber Bailey #	Payment Method	Dispenser  12/06/2022	12/06/2022	dextroamp-amphetamin 30 mg tab	30	30	V[facility], [facility]	[id number]	Medicaid	[facility] #[id number]  05/13/2022	05/17/2022	dextroamp-amphetamin 30 mg tab	30	30	Vj Chowdary MD, [facility]	[id number]	[facility] #[id number]    Patient Name: Wilbert Finch Date: 1990  Address: [address] [place] [id number]: Male  Rx Written	Rx Dispensed	Drug	Quantity	Days Supply	Prescriber Name	Prescriber Bailey #	Payment Method	Dispenser  03/15/2023	03/15/2023	lorazepam 2 mg tablet	28	14	Milagros Angeles MD	[id number]	Medicaid	[facility] #[id number]  02/07/2023	02/07/2023	dextroamp-amphetamin 30 mg tab	30	30	Vj Chowdary MD, [facility]	[id number]	Medicaid	[facility] #[id number]  09/16/2022	09/16/2022	dextroamp-amphetamin 30 mg tab	30	30	Vj Chowdary MD, [facility]	[id number]	Medicaid	[facility] #[id number] Reference #: [id number]    Others' Prescriptions  Patient Name: Wilbert Finch Date: 1990  Address: [address] [place] [id number]: Male  Rx Written	Rx Dispensed	Drug	Quantity	Days Supply	Prescriber Name	Prescriber Bailey #	Payment Method	Dispenser  10/13/2022	10/14/2022	dextroamp-amphetamin 30 mg tab	30	30	Vj Chowdary MD, [facility]	[id number]	Medicaid	[facility] #[id number]    Patient Name: Wilbert Finch Date: 1990  Address: [address] [place] [id number]: Male  Rx Written	Rx Dispensed	Drug	Quantity	Days Supply	Prescriber Name	Prescriber Bailey #	Payment Method	Dispenser  12/06/2022	12/06/2022	dextroamp-amphetamin 30 mg tab	30	30	Vj Chowdary MD, [facility]	[id number]	Medicaid	[facility] #[id number]  05/13/2022	05/17/2022	dextroamp-amphetamin 30 mg tab	30	30	Vj Chowdary MD, [facility]	[id number]	[facility] #[id number]    Patient Name: Wilbert Finch Date: 1990  Address: [address] [place] [id number]: Male  Rx Written	Rx Dispensed	Drug	Quantity	Days Supply	Prescriber Name	Prescriber Bailey #	Payment Method	Dispenser  03/15/2023	03/15/2023	lorazepam 2 mg tablet	28	14	Milagros Angeles MD	[id number]	Medicaid	[facility] #[id number]  02/07/2023	02/07/2023	dextroamp-amphetamin 30 mg tab	30	30	Vj Chowdary MD, [facility]	[id number]	Medicaid	[facility] #[id number]  09/16/2022	09/16/2022	dextroamp-amphetamin 30 mg tab	30	30	Vj Chowdary MD, [facility]	[id number]	Medicaid	[facility] #[id number]

## 2023-04-16 NOTE — BH CONSULTATION LIAISON PROGRESS NOTE - NSBHCONSULTMEDAGITATION_PSY_A_CORE FT
ativan 2mg PO Q6H po/im/iv

## 2023-04-16 NOTE — BH CONSULTATION LIAISON PROGRESS NOTE - NSBHMSEPERCEPT_PSY_A_CORE
No abnormalities
Auditory hallucinations/Visual hallucinations
No abnormalities
Auditory hallucinations/Visual hallucinations

## 2023-04-16 NOTE — BH CONSULTATION LIAISON PROGRESS NOTE - NSBHFUPINTERVALCCFT_PSY_A_CORE
"I still want to go home."
"I want to get outta here"
"I want to make it up to my girlfriend"
"I 'm very sad"

## 2023-04-16 NOTE — BH CONSULTATION LIAISON PROGRESS NOTE - NSBHCONSULTWORKUP_PSY_A_CORE
yes
yes
I have re-evaluated the patient's fluid status and reviewed vital signs. Clinical perfusion assessment was performed.
yes
yes

## 2023-04-16 NOTE — BH CONSULTATION LIAISON PROGRESS NOTE - NSICDXBHPRIMARYDX_PSY_ALL_CORE
Alcohol withdrawal syndrome with complication   F10.936   Alcohol withdrawal syndrome with complication   F10.937   Alcohol withdrawal syndrome with complication   F10.934

## 2023-04-16 NOTE — BH CONSULTATION LIAISON PROGRESS NOTE - CURRENT MEDICATION
MEDICATIONS  (STANDING):  folic acid 1 milliGRAM(s) Oral daily  lactated ringers. 1000 milliLiter(s) (150 mL/Hr) IV Continuous <Continuous>  LORazepam   Injectable   IV Push   LORazepam   Injectable 2 milliGRAM(s) IV Push every 4 hours  methadone    Tablet 60 milliGRAM(s) Oral daily  multivitamin 1 Tablet(s) Oral daily  nicotine - 21 mG/24Hr(s) Patch 1 Patch Transdermal daily  pantoprazole    Tablet 40 milliGRAM(s) Oral before breakfast  thiamine IVPB 500 milliGRAM(s) IV Intermittent three times a day    MEDICATIONS  (PRN):  acetaminophen     Tablet .. 650 milliGRAM(s) Oral every 6 hours PRN Temp greater or equal to 38C (100.4F), Mild Pain (1 - 3)  aluminum hydroxide/magnesium hydroxide/simethicone Suspension 30 milliLiter(s) Oral every 4 hours PRN Dyspepsia  LORazepam     Tablet 2 milliGRAM(s) Oral every 2 hours PRN Symptom-triggered 2 point increase in CIWA-Ar  LORazepam   Injectable 4 milliGRAM(s) IV Push every 1 hour PRN Symptom-triggered: each CIWA -Ar score 8 or GREATER  melatonin 3 milliGRAM(s) Oral at bedtime PRN Insomnia  ondansetron Injectable 4 milliGRAM(s) IV Push every 8 hours PRN Nausea and/or Vomiting  
MEDICATIONS  (STANDING):  folic acid 1 milliGRAM(s) Oral daily  lactated ringers. 1000 milliLiter(s) (150 mL/Hr) IV Continuous <Continuous>  LORazepam   Injectable   IV Push   LORazepam   Injectable 4 milliGRAM(s) IV Push every 4 hours  methadone    Tablet 60 milliGRAM(s) Oral daily  multivitamin 1 Tablet(s) Oral daily  nicotine - 21 mG/24Hr(s) Patch 1 Patch Transdermal daily  pantoprazole    Tablet 40 milliGRAM(s) Oral before breakfast  thiamine IVPB 500 milliGRAM(s) IV Intermittent three times a day    MEDICATIONS  (PRN):  acetaminophen     Tablet .. 650 milliGRAM(s) Oral every 6 hours PRN Temp greater or equal to 38C (100.4F), Mild Pain (1 - 3)  aluminum hydroxide/magnesium hydroxide/simethicone Suspension 30 milliLiter(s) Oral every 4 hours PRN Dyspepsia  LORazepam     Tablet 2 milliGRAM(s) Oral every 2 hours PRN Symptom-triggered 2 point increase in CIWA-Ar  LORazepam   Injectable 4 milliGRAM(s) IV Push every 1 hour PRN Symptom-triggered: each CIWA -Ar score 8 or GREATER  melatonin 3 milliGRAM(s) Oral at bedtime PRN Insomnia  ondansetron Injectable 4 milliGRAM(s) IV Push every 8 hours PRN Nausea and/or Vomiting  
MEDICATIONS  (STANDING):  folic acid 1 milliGRAM(s) Oral daily  lactated ringers. 1000 milliLiter(s) (150 mL/Hr) IV Continuous <Continuous>  LORazepam   Injectable   IV Push   LORazepam   Injectable 3 milliGRAM(s) IV Push every 4 hours  methadone    Tablet 60 milliGRAM(s) Oral daily  multivitamin 1 Tablet(s) Oral daily  nicotine - 21 mG/24Hr(s) Patch 1 Patch Transdermal daily  pantoprazole    Tablet 40 milliGRAM(s) Oral before breakfast  thiamine IVPB 500 milliGRAM(s) IV Intermittent three times a day    MEDICATIONS  (PRN):  acetaminophen     Tablet .. 650 milliGRAM(s) Oral every 6 hours PRN Temp greater or equal to 38C (100.4F), Mild Pain (1 - 3)  aluminum hydroxide/magnesium hydroxide/simethicone Suspension 30 milliLiter(s) Oral every 4 hours PRN Dyspepsia  LORazepam     Tablet 2 milliGRAM(s) Oral every 2 hours PRN Symptom-triggered 2 point increase in CIWA-Ar  LORazepam   Injectable 4 milliGRAM(s) IV Push every 1 hour PRN Symptom-triggered: each CIWA -Ar score 8 or GREATER  melatonin 3 milliGRAM(s) Oral at bedtime PRN Insomnia  ondansetron Injectable 4 milliGRAM(s) IV Push every 8 hours PRN Nausea and/or Vomiting  
MEDICATIONS  (STANDING):  folic acid 1 milliGRAM(s) Oral daily  lactated ringers. 1000 milliLiter(s) (150 mL/Hr) IV Continuous <Continuous>  LORazepam   Injectable   IV Push   LORazepam   Injectable 1 milliGRAM(s) IV Push every 4 hours  methadone    Tablet 60 milliGRAM(s) Oral daily  multivitamin 1 Tablet(s) Oral daily  nicotine - 21 mG/24Hr(s) Patch 1 Patch Transdermal daily  pantoprazole    Tablet 40 milliGRAM(s) Oral before breakfast    MEDICATIONS  (PRN):  acetaminophen     Tablet .. 650 milliGRAM(s) Oral every 6 hours PRN Temp greater or equal to 38C (100.4F), Mild Pain (1 - 3)  aluminum hydroxide/magnesium hydroxide/simethicone Suspension 30 milliLiter(s) Oral every 4 hours PRN Dyspepsia  LORazepam     Tablet 2 milliGRAM(s) Oral every 2 hours PRN Symptom-triggered 2 point increase in CIWA-Ar  LORazepam   Injectable 4 milliGRAM(s) IV Push every 1 hour PRN Symptom-triggered: each CIWA -Ar score 8 or GREATER  melatonin 3 milliGRAM(s) Oral at bedtime PRN Insomnia  ondansetron Injectable 4 milliGRAM(s) IV Push every 8 hours PRN Nausea and/or Vomiting

## 2023-04-16 NOTE — BH CONSULTATION LIAISON PROGRESS NOTE - NSBHPSYCHOLCOGABN_PSY_A_CORE
disoriented to time/disoriented to place/disoriented to situation
disoriented to time/disoriented to place/disoriented to situation
disoriented to time/disoriented to place
disoriented to time/disoriented to place

## 2023-04-16 NOTE — BH CONSULTATION LIAISON PROGRESS NOTE - NSBHCONSULTRECOMMENDOTHER_PSY_A_CORE FT
Safety: No indication for psychiatric CO but would consider enhanced supervision given his delirium/fall risk  EKG to monitor QTc, if >500 No antipsychotics    Medications:  - high risk ativan taper given hx of icu admissions/withdrawal seizures + Sx triggered CIWA  -High dose thiamine 500mg TID IVP x 3-5 days  -c/w Folate supplementation  agitation: Ativan 2mg PO/IM/IV Q6H PRN  -Please confirm Methadone dose in a maintenance program or, if unable, switch to Methadone detox    Dispo: No capacity to leave [place] at this time  SBIRT consult  Safety: No indication for psychiatric CO but would consider enhanced supervision given his delirium/fall risk  EKG to monitor QTc, if >500 No antipsychotics    Medications:  - high risk ativan taper given hx of icu admissions/withdrawal seizures + Sx triggered CIWA  -High dose thiamine 500mg TID IVP x 3-5 days  -c/w Folate supplementation  agitation: Ativan 2mg PO/IM/IV Q6H PRN  -Please confirm Methadone dose in a maintenance program or, if unable, switch to Methadone detox    Dispo: No capacity to leave [place] at this time  SBIRT consult  Safety: No indication for psychiatric CO but would consider enhanced supervision given his delirium/fall risk  EKG to monitor QTc, if >500 No antipsychotics    Medications:  - high risk ativan taper given hx of icu admissions/withdrawal seizures + Sx triggered CIWA  -High dose thiamine 500mg TID IVP x 3-5 days  -c/w Folate supplementation  agitation: Ativan 2mg PO/IM/IV Q6H PRN  -Please confirm Methadone dose in a maintenance program or, if unable, switch to Methadone detox    Dispo: No capacity to leave [place] at this time  SBIRT consult

## 2023-04-17 PROCEDURE — 99232 SBSQ HOSP IP/OBS MODERATE 35: CPT

## 2023-04-17 RX ADMIN — Medication 1 MILLIGRAM(S): at 22:15

## 2023-04-17 RX ADMIN — Medication 1 MILLIGRAM(S): at 10:26

## 2023-04-17 RX ADMIN — Medication 1 MILLIGRAM(S): at 01:03

## 2023-04-17 RX ADMIN — PANTOPRAZOLE SODIUM 40 MILLIGRAM(S): 20 TABLET, DELAYED RELEASE ORAL at 05:31

## 2023-04-17 RX ADMIN — Medication 1 MILLIGRAM(S): at 17:27

## 2023-04-17 RX ADMIN — Medication 2 MILLIGRAM(S): at 08:06

## 2023-04-17 RX ADMIN — Medication 1 TABLET(S): at 10:26

## 2023-04-17 RX ADMIN — Medication 1 PATCH: at 10:28

## 2023-04-17 RX ADMIN — Medication 1 MILLIGRAM(S): at 05:31

## 2023-04-17 RX ADMIN — Medication 1 PATCH: at 17:29

## 2023-04-17 RX ADMIN — METHADONE HYDROCHLORIDE 60 MILLIGRAM(S): 40 TABLET ORAL at 10:26

## 2023-04-17 RX ADMIN — Medication 3 MILLIGRAM(S): at 21:18

## 2023-04-17 RX ADMIN — Medication 1 PATCH: at 17:27

## 2023-04-17 RX ADMIN — Medication 2 MILLIGRAM(S): at 21:17

## 2023-04-17 RX ADMIN — Medication 2 MILLIGRAM(S): at 09:49

## 2023-04-17 NOTE — PROGRESS NOTE ADULT - PROBLEM SELECTOR PROBLEM 1
Alcohol intoxication

## 2023-04-17 NOTE — PROGRESS NOTE ADULT - REASON FOR ADMISSION
alcohol intoxication c/f impending withdrawal

## 2023-04-17 NOTE — PROGRESS NOTE ADULT - NUTRITIONAL ASSESSMENT
This patient has been assessed with a concern for Malnutrition and has been determined to have a diagnosis/diagnoses of Moderate protein-calorie malnutrition.    This patient is being managed with:   Diet Regular-  Supplement Feeding Modality:  Oral  Ensure Plus High Protein Cans or Servings Per Day:  2       Frequency:  Daily  Entered: Apr 13 2023 12:28PM  

## 2023-04-17 NOTE — PROGRESS NOTE ADULT - PROBLEM SELECTOR PLAN 3
trauma work up with no acute significant pathological findings  pain control as needed On methadone program  Several calls to methadone clinic unsuccessful  Cont current methadone dose per psych recs

## 2023-04-17 NOTE — PROGRESS NOTE ADULT - PROBLEM SELECTOR PLAN 6
RESOLVED. hyperNa, hypok, high anion gap (lactic acidosis + elevated bac); all consistent with dehydration/hypovolemia iso alcohol intoxication  s/p 1 L NS in ER  encourage po intake; IVF resusci + electrolyte supplementation as needed in the mean time Resolved

## 2023-04-17 NOTE — PROGRESS NOTE ADULT - PROBLEM SELECTOR PLAN 4
mild normocytic anemia + mild thrombocytopenia, likely iso chronic alcohol abuse  ct imaging with no evidence of cirrhosis, portal htn, splenomegaly  Monitor hgb and plt w daily CBC; Goal Hb >7 g/dl,  Plt >10k, >20k if septic, >50k if actively bleeding  maintain adequate PIV and active type and screen; transfuse blood product as needed to maintain goal Likely ETOH induced BM suppression  - stable, outpatient followup

## 2023-04-17 NOTE — PROGRESS NOTE ADULT - TIME BILLING
chart reviewing, history taking, physical exam, assessment and documentation, including speaking to specialist/SW/CM regarding the management. - preparing to see the patient (eg, review of tests, documents)  - obtaining and/or reviewing separately obtained history  - performing a medically appropriate examination and/or evaluation  - counseling and educating the patient/family/caregiver  - ordering medications, tests, or procedures  - referring and communicating with other health care professionals  - documenting clinical information in the electronic or other health record  - independently interpreting results and communicating results to the patient/family/caregiver  - care coordination

## 2023-04-17 NOTE — PROGRESS NOTE ADULT - PROBLEM SELECTOR PLAN 2
IMPROVING, h/o hcv Ast:alt > 2:1, ct a+p shows fatty liver, ck wnl; all likely iso alcohol intoxication. albumin and coags wnl  -avoid hepatotoxic agents;   -trend lfts daily 2/2 chronic ETOH use, stable  -avoid hepatotoxic agents;   -trend lfts daily
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
## 2023-04-17 NOTE — PROGRESS NOTE ADULT - SUBJECTIVE AND OBJECTIVE BOX
[facility] Division of Hospital Medicine  Jessica Fairchild MD  Pager (M-F, 8A-5P): [phone]  Other Times:  [phone]    Patient is a 32y old  Male who presents with a chief complaint of alcohol intoxication c/f impending withdrawal (16 Apr 2023 14:51)      SUBJECTIVE / OVERNIGHT EVENTS:  ADDITIONAL REVIEW OF SYSTEMS:    MEDICATIONS  (STANDING):  folic acid 1 milliGRAM(s) Oral daily  LORazepam   Injectable 1 milliGRAM(s) IV Push every 12 hours  LORazepam   Injectable   IV Push   methadone    Tablet 60 milliGRAM(s) Oral daily  multivitamin 1 Tablet(s) Oral daily  nicotine - 21 mG/24Hr(s) Patch 1 Patch Transdermal daily  pantoprazole    Tablet 40 milliGRAM(s) Oral before breakfast    MEDICATIONS  (PRN):  acetaminophen     Tablet .. 650 milliGRAM(s) Oral every 6 hours PRN Temp greater or equal to 38C (100.4F), Mild Pain (1 - 3)  aluminum hydroxide/magnesium hydroxide/simethicone Suspension 30 milliLiter(s) Oral every 4 hours PRN Dyspepsia  LORazepam     Tablet 2 milliGRAM(s) Oral every 2 hours PRN Symptom-triggered 2 point increase in CIWA-Ar  LORazepam   Injectable 4 milliGRAM(s) IV Push every 1 hour PRN Symptom-triggered: each CIWA -Ar score 8 or GREATER  melatonin 3 milliGRAM(s) Oral at bedtime PRN Insomnia  ondansetron Injectable 4 milliGRAM(s) IV Push every 8 hours PRN Nausea and/or Vomiting      CAPILLARY BLOOD GLUCOSE        I&O's Summary    16 Apr 2023 07:01  -  17 Apr 2023 07:00  --------------------------------------------------------  IN: 540 mL / OUT: 400 mL / NET: 140 mL        PHYSICAL EXAM:  Vital Signs Last 24 Hrs  T(C): 36.9 (17 Apr 2023 10:26), Max: 36.9 (17 Apr 2023 08:00)  T(F): 98.5 (17 Apr 2023 10:26), Max: 98.5 (17 Apr 2023 08:00)  HR: 113 (17 Apr 2023 10:26) (83 - 113)  BP: 115/78 (17 Apr 2023 10:26) (105/64 - 122/82)  BP(mean): --  RR: 18 (17 Apr 2023 10:26) (18 - 18)  SpO2: 97% (17 Apr 2023 10:26) (94% - 98%)    Parameters below as of 17 Apr 2023 10:26  Patient On (Oxygen Delivery Method): room air      CONSTITUTIONAL: NAD, well-developed, well-groomed  EYES: PERRLA; conjunctiva and sclera clear  ENMT: Moist oral mucosa, no pharyngeal injection or exudates; normal dentition  NECK: Supple, no palpable masses; no thyromegaly  RESPIRATORY: Normal respiratory effort; lungs are clear to auscultation bilaterally  CARDIOVASCULAR: Regular rate and rhythm, normal S1 and S2, no murmur/rub/gallop; No lower extremity edema; Peripheral pulses are 2+ bilaterally  ABDOMEN: Nontender to palpation, normoactive bowel sounds, no rebound/guarding; No hepatosplenomegaly  MUSCULOSKELETAL:  Normal gait; no clubbing or cyanosis of digits; no joint swelling or tenderness to palpation  PSYCH: A+O to person, place, and time; affect appropriate  NEUROLOGY: CN 2-12 are intact and symmetric; no gross sensory deficits   SKIN: No rashes; no palpable lesions    LABS:       [facility] Division of Hospital Medicine  Jessica Fairchild MD  Pager (M-F, 8A-5P): [phone]  Other Times:  [phone]    Patient is a 32y old  Male who presents with a chief complaint of alcohol intoxication c/f impending withdrawal (16 Apr 2023 14:51)      SUBJECTIVE / OVERNIGHT EVENTS:  ADDITIONAL REVIEW OF SYSTEMS:    MEDICATIONS  (STANDING):  folic acid 1 milliGRAM(s) Oral daily  LORazepam   Injectable 1 milliGRAM(s) IV Push every 12 hours  LORazepam   Injectable   IV Push   methadone    Tablet 60 milliGRAM(s) Oral daily  multivitamin 1 Tablet(s) Oral daily  nicotine - 21 mG/24Hr(s) Patch 1 Patch Transdermal daily  pantoprazole    Tablet 40 milliGRAM(s) Oral before breakfast    MEDICATIONS  (PRN):  acetaminophen     Tablet .. 650 milliGRAM(s) Oral every 6 hours PRN Temp greater or equal to 38C (100.4F), Mild Pain (1 - 3)  aluminum hydroxide/magnesium hydroxide/simethicone Suspension 30 milliLiter(s) Oral every 4 hours PRN Dyspepsia  LORazepam     Tablet 2 milliGRAM(s) Oral every 2 hours PRN Symptom-triggered 2 point increase in CIWA-Ar  LORazepam   Injectable 4 milliGRAM(s) IV Push every 1 hour PRN Symptom-triggered: each CIWA -Ar score 8 or GREATER  melatonin 3 milliGRAM(s) Oral at bedtime PRN Insomnia  ondansetron Injectable 4 milliGRAM(s) IV Push every 8 hours PRN Nausea and/or Vomiting      CAPILLARY BLOOD GLUCOSE        I&O's Summary    16 Apr 2023 07:01  -  17 Apr 2023 07:00  --------------------------------------------------------  IN: 540 mL / OUT: 400 mL / NET: 140 mL        PHYSICAL EXAM:  Vital Signs Last 24 Hrs  T(C): 36.9 (17 Apr 2023 10:26), Max: 36.9 (17 Apr 2023 08:00)  T(F): 98.5 (17 Apr 2023 10:26), Max: 98.5 (17 Apr 2023 08:00)  HR: 113 (17 Apr 2023 10:26) (83 - 113)  BP: 115/78 (17 Apr 2023 10:26) (105/64 - 122/82)  BP(mean): --  RR: 18 (17 Apr 2023 10:26) (18 - 18)  SpO2: 97% (17 Apr 2023 10:26) (94% - 98%)    Parameters below as of 17 Apr 2023 10:26  Patient On (Oxygen Delivery Method): room air      CONSTITUTIONAL: NAD, well-developed, well-groomed  EYES: PERRLA; conjunctiva and sclera clear  ENMT: Moist oral mucosa, no pharyngeal injection or exudates; normal dentition  NECK: Supple, no palpable masses; no thyromegaly  RESPIRATORY: Normal respiratory effort; lungs are clear to auscultation bilaterally  CARDIOVASCULAR: Regular rate and rhythm, normal S1 and S2, no murmur/rub/gallop; No lower extremity edema; Peripheral pulses are 2+ bilaterally  ABDOMEN: Nontender to palpation, normoactive bowel sounds, no rebound/guarding; No hepatosplenomegaly  MUSCULOSKELETAL:  Normal gait; no clubbing or cyanosis of digits; no joint swelling or tenderness to palpation  PSYCH: A+O to person, place, and time; affect appropriate  NEUROLOGY: CN 2-12 are intact and symmetric; no gross sensory deficits   SKIN: No rashes; no palpable lesions    LABS:       [facility] Division of Hospital Medicine  Jessica Fairchild MD  Pager (M-F, 8A-5P): [phone]  Other Times:  [phone]    Patient is a 32y old  Male who presents with a chief complaint of alcohol intoxication c/f impending withdrawal (16 Apr 2023 14:51)      SUBJECTIVE / OVERNIGHT EVENTS:  ADDITIONAL REVIEW OF SYSTEMS:    MEDICATIONS  (STANDING):  folic acid 1 milliGRAM(s) Oral daily  LORazepam   Injectable 1 milliGRAM(s) IV Push every 12 hours  LORazepam   Injectable   IV Push   methadone    Tablet 60 milliGRAM(s) Oral daily  multivitamin 1 Tablet(s) Oral daily  nicotine - 21 mG/24Hr(s) Patch 1 Patch Transdermal daily  pantoprazole    Tablet 40 milliGRAM(s) Oral before breakfast    MEDICATIONS  (PRN):  acetaminophen     Tablet .. 650 milliGRAM(s) Oral every 6 hours PRN Temp greater or equal to 38C (100.4F), Mild Pain (1 - 3)  aluminum hydroxide/magnesium hydroxide/simethicone Suspension 30 milliLiter(s) Oral every 4 hours PRN Dyspepsia  LORazepam     Tablet 2 milliGRAM(s) Oral every 2 hours PRN Symptom-triggered 2 point increase in CIWA-Ar  LORazepam   Injectable 4 milliGRAM(s) IV Push every 1 hour PRN Symptom-triggered: each CIWA -Ar score 8 or GREATER  melatonin 3 milliGRAM(s) Oral at bedtime PRN Insomnia  ondansetron Injectable 4 milliGRAM(s) IV Push every 8 hours PRN Nausea and/or Vomiting      CAPILLARY BLOOD GLUCOSE        I&O's Summary    16 Apr 2023 07:01  -  17 Apr 2023 07:00  --------------------------------------------------------  IN: 540 mL / OUT: 400 mL / NET: 140 mL        PHYSICAL EXAM:  Vital Signs Last 24 Hrs  T(C): 36.9 (17 Apr 2023 10:26), Max: 36.9 (17 Apr 2023 08:00)  T(F): 98.5 (17 Apr 2023 10:26), Max: 98.5 (17 Apr 2023 08:00)  HR: 113 (17 Apr 2023 10:26) (83 - 113)  BP: 115/78 (17 Apr 2023 10:26) (105/64 - 122/82)  BP(mean): --  RR: 18 (17 Apr 2023 10:26) (18 - 18)  SpO2: 97% (17 Apr 2023 10:26) (94% - 98%)    Parameters below as of 17 Apr 2023 10:26  Patient On (Oxygen Delivery Method): room air      CONSTITUTIONAL: NAD, well-developed, well-groomed  EYES: PERRLA; conjunctiva and sclera clear  ENMT: Moist oral mucosa, no pharyngeal injection or exudates; normal dentition  NECK: Supple, no palpable masses; no thyromegaly  RESPIRATORY: Normal respiratory effort; lungs are clear to auscultation bilaterally  CARDIOVASCULAR: Regular rate and rhythm, normal S1 and S2, no murmur/rub/gallop; No lower extremity edema; Peripheral pulses are 2+ bilaterally  ABDOMEN: Nontender to palpation, normoactive bowel sounds, no rebound/guarding; No hepatosplenomegaly  MUSCULOSKELETAL:  Normal gait; no clubbing or cyanosis of digits; no joint swelling or tenderness to palpation  PSYCH: A+O to person, place, and time; affect appropriate  NEUROLOGY: CN 2-12 are intact and symmetric; no gross sensory deficits   SKIN: No rashes; no palpable lesions    LABS:       [facility] Division of Hospital Medicine  Jessica Fairchild MD  Pager (M-F, 8A-5P): [phone]  Other Times:  [phone]    Patient is a 32y old  Male who presents with a chief complaint of alcohol intoxication c/f impending withdrawal (16 Apr 2023 14:51)      SUBJECTIVE / OVERNIGHT EVENTS: No acute events. Continues to request to leave AMA. Several calls to his methadone clinic by me unsuccessful DCP for tomorrow  ADDITIONAL REVIEW OF SYSTEMS: Denies chest pain. SOB, tremors, hallucinations    MEDICATIONS  (STANDING):  folic acid 1 milliGRAM(s) Oral daily  LORazepam   Injectable 1 milliGRAM(s) IV Push every 12 hours  LORazepam   Injectable   IV Push   methadone    Tablet 60 milliGRAM(s) Oral daily  multivitamin 1 Tablet(s) Oral daily  nicotine - 21 mG/24Hr(s) Patch 1 Patch Transdermal daily  pantoprazole    Tablet 40 milliGRAM(s) Oral before breakfast    MEDICATIONS  (PRN):  acetaminophen     Tablet .. 650 milliGRAM(s) Oral every 6 hours PRN Temp greater or equal to 38C (100.4F), Mild Pain (1 - 3)  aluminum hydroxide/magnesium hydroxide/simethicone Suspension 30 milliLiter(s) Oral every 4 hours PRN Dyspepsia  LORazepam     Tablet 2 milliGRAM(s) Oral every 2 hours PRN Symptom-triggered 2 point increase in CIWA-Ar  LORazepam   Injectable 4 milliGRAM(s) IV Push every 1 hour PRN Symptom-triggered: each CIWA -Ar score 8 or GREATER  melatonin 3 milliGRAM(s) Oral at bedtime PRN Insomnia  ondansetron Injectable 4 milliGRAM(s) IV Push every 8 hours PRN Nausea and/or Vomiting      CAPILLARY BLOOD GLUCOSE        I&O's Summary    16 Apr 2023 07:01  -  17 Apr 2023 07:00  --------------------------------------------------------  IN: 540 mL / OUT: 400 mL / NET: 140 mL        PHYSICAL EXAM:  Vital Signs Last 24 Hrs  T(C): 36.9 (17 Apr 2023 10:26), Max: 36.9 (17 Apr 2023 08:00)  T(F): 98.5 (17 Apr 2023 10:26), Max: 98.5 (17 Apr 2023 08:00)  HR: 113 (17 Apr 2023 10:26) (83 - 113)  BP: 115/78 (17 Apr 2023 10:26) (105/64 - 122/82)  BP(mean): --  RR: 18 (17 Apr 2023 10:26) (18 - 18)  SpO2: 97% (17 Apr 2023 10:26) (94% - 98%)    Parameters below as of 17 Apr 2023 10:26  Patient On (Oxygen Delivery Method): room air      CONSTITUTIONAL: NAD, discheveled  EYES: PERRLA; conjunctiva and sclera clear  ENMT: Moist oral mucosa, no pharyngeal injection or exudates; normal dentition  NECK: Supple, no palpable masses; no thyromegaly  RESPIRATORY: Normal respiratory effort; lungs are clear to auscultation bilaterally  CARDIOVASCULAR: Regular rate and rhythm, normal S1 and S2, no murmur/rub/gallop; No lower extremity edema; Peripheral pulses are 2+ bilaterally  ABDOMEN: Nontender to palpation, normoactive bowel sounds, no rebound/guarding; No hepatosplenomegaly  MUSCULOSKELETAL:  No clubbing or cyanosis of digits; no joint swelling or tenderness to palpation  PSYCH: A+O to person, place, and time; Slowed speech  NEUROLOGY: CN 2-12 are intact and symmetric; no gross sensory deficits   SKIN: No rashes; no palpable lesions    LABS: no new labs/imaging to review       [facility] Division of Hospital Medicine  Jessica Fairchild MD  Pager (M-F, 8A-5P): [phone]  Other Times:  [phone]    Patient is a 32y old  Male who presents with a chief complaint of alcohol intoxication c/f impending withdrawal (16 Apr 2023 14:51)      SUBJECTIVE / OVERNIGHT EVENTS: No acute events. Continues to request to leave AMA. Several calls to his methadone clinic by me unsuccessful DCP for tomorrow  ADDITIONAL REVIEW OF SYSTEMS: Denies chest pain. SOB, tremors, hallucinations    MEDICATIONS  (STANDING):  folic acid 1 milliGRAM(s) Oral daily  LORazepam   Injectable 1 milliGRAM(s) IV Push every 12 hours  LORazepam   Injectable   IV Push   methadone    Tablet 60 milliGRAM(s) Oral daily  multivitamin 1 Tablet(s) Oral daily  nicotine - 21 mG/24Hr(s) Patch 1 Patch Transdermal daily  pantoprazole    Tablet 40 milliGRAM(s) Oral before breakfast    MEDICATIONS  (PRN):  acetaminophen     Tablet .. 650 milliGRAM(s) Oral every 6 hours PRN Temp greater or equal to 38C (100.4F), Mild Pain (1 - 3)  aluminum hydroxide/magnesium hydroxide/simethicone Suspension 30 milliLiter(s) Oral every 4 hours PRN Dyspepsia  LORazepam     Tablet 2 milliGRAM(s) Oral every 2 hours PRN Symptom-triggered 2 point increase in CIWA-Ar  LORazepam   Injectable 4 milliGRAM(s) IV Push every 1 hour PRN Symptom-triggered: each CIWA -Ar score 8 or GREATER  melatonin 3 milliGRAM(s) Oral at bedtime PRN Insomnia  ondansetron Injectable 4 milliGRAM(s) IV Push every 8 hours PRN Nausea and/or Vomiting      CAPILLARY BLOOD GLUCOSE        I&O's Summary    16 Apr 2023 07:01  -  17 Apr 2023 07:00  --------------------------------------------------------  IN: 540 mL / OUT: 400 mL / NET: 140 mL        PHYSICAL EXAM:  Vital Signs Last 24 Hrs  T(C): 36.9 (17 Apr 2023 10:26), Max: 36.9 (17 Apr 2023 08:00)  T(F): 98.5 (17 Apr 2023 10:26), Max: 98.5 (17 Apr 2023 08:00)  HR: 113 (17 Apr 2023 10:26) (83 - 113)  BP: 115/78 (17 Apr 2023 10:26) (105/64 - 122/82)  BP(mean): --  RR: 18 (17 Apr 2023 10:26) (18 - 18)  SpO2: 97% (17 Apr 2023 10:26) (94% - 98%)    Parameters below as of 17 Apr 2023 10:26  Patient On (Oxygen Delivery Method): room air      CONSTITUTIONAL: NAD, discheveled  EYES: PERRLA; conjunctiva and sclera clear  ENMT: Moist oral mucosa, no pharyngeal injection or exudates; normal dentition  NECK: Supple, no palpable masses; no thyromegaly  RESPIRATORY: Normal respiratory effort; lungs are clear to auscultation bilaterally  CARDIOVASCULAR: Regular rate and rhythm, normal S1 and S2, no murmur/rub/gallop; No lower extremity edema; Peripheral pulses are 2+ bilaterally  ABDOMEN: Nontender to palpation, normoactive bowel sounds, no rebound/guarding; No hepatosplenomegaly  MUSCULOSKELETAL:  No clubbing or cyanosis of digits; no joint swelling or tenderness to palpation  PSYCH: A+O to person, place, and time; Slowed speech  NEUROLOGY: CN 2-12 are intact and symmetric; no gross sensory deficits   SKIN: No rashes; no palpable lesions    LABS: no new labs/imaging to review       [facility] Division of Hospital Medicine  Jessica Fairchild MD  Pager (M-F, 8A-5P): [phone]  Other Times:  [phone]    Patient is a 32y old  Male who presents with a chief complaint of alcohol intoxication c/f impending withdrawal (16 Apr 2023 14:51)      SUBJECTIVE / OVERNIGHT EVENTS: No acute events. Continues to request to leave AMA. Several calls to his methadone clinic by me unsuccessful DCP for tomorrow  ADDITIONAL REVIEW OF SYSTEMS: Denies chest pain. SOB, tremors, hallucinations    MEDICATIONS  (STANDING):  folic acid 1 milliGRAM(s) Oral daily  LORazepam   Injectable 1 milliGRAM(s) IV Push every 12 hours  LORazepam   Injectable   IV Push   methadone    Tablet 60 milliGRAM(s) Oral daily  multivitamin 1 Tablet(s) Oral daily  nicotine - 21 mG/24Hr(s) Patch 1 Patch Transdermal daily  pantoprazole    Tablet 40 milliGRAM(s) Oral before breakfast    MEDICATIONS  (PRN):  acetaminophen     Tablet .. 650 milliGRAM(s) Oral every 6 hours PRN Temp greater or equal to 38C (100.4F), Mild Pain (1 - 3)  aluminum hydroxide/magnesium hydroxide/simethicone Suspension 30 milliLiter(s) Oral every 4 hours PRN Dyspepsia  LORazepam     Tablet 2 milliGRAM(s) Oral every 2 hours PRN Symptom-triggered 2 point increase in CIWA-Ar  LORazepam   Injectable 4 milliGRAM(s) IV Push every 1 hour PRN Symptom-triggered: each CIWA -Ar score 8 or GREATER  melatonin 3 milliGRAM(s) Oral at bedtime PRN Insomnia  ondansetron Injectable 4 milliGRAM(s) IV Push every 8 hours PRN Nausea and/or Vomiting      CAPILLARY BLOOD GLUCOSE        I&O's Summary    16 Apr 2023 07:01  -  17 Apr 2023 07:00  --------------------------------------------------------  IN: 540 mL / OUT: 400 mL / NET: 140 mL        PHYSICAL EXAM:  Vital Signs Last 24 Hrs  T(C): 36.9 (17 Apr 2023 10:26), Max: 36.9 (17 Apr 2023 08:00)  T(F): 98.5 (17 Apr 2023 10:26), Max: 98.5 (17 Apr 2023 08:00)  HR: 113 (17 Apr 2023 10:26) (83 - 113)  BP: 115/78 (17 Apr 2023 10:26) (105/64 - 122/82)  BP(mean): --  RR: 18 (17 Apr 2023 10:26) (18 - 18)  SpO2: 97% (17 Apr 2023 10:26) (94% - 98%)    Parameters below as of 17 Apr 2023 10:26  Patient On (Oxygen Delivery Method): room air      CONSTITUTIONAL: NAD, discheveled  EYES: PERRLA; conjunctiva and sclera clear  ENMT: Moist oral mucosa, no pharyngeal injection or exudates; normal dentition  NECK: Supple, no palpable masses; no thyromegaly  RESPIRATORY: Normal respiratory effort; lungs are clear to auscultation bilaterally  CARDIOVASCULAR: Regular rate and rhythm, normal S1 and S2, no murmur/rub/gallop; No lower extremity edema; Peripheral pulses are 2+ bilaterally  ABDOMEN: Nontender to palpation, normoactive bowel sounds, no rebound/guarding; No hepatosplenomegaly  MUSCULOSKELETAL:  No clubbing or cyanosis of digits; no joint swelling or tenderness to palpation  PSYCH: A+O to person, place, and time; Slowed speech  NEUROLOGY: CN 2-12 are intact and symmetric; no gross sensory deficits   SKIN: No rashes; no palpable lesions    LABS: no new labs/imaging to review

## 2023-04-17 NOTE — PROGRESS NOTE ADULT - PROBLEM SELECTOR PLAN 5
initial ct a+p showed large distended urinary bladder + bilateral hydronephrosis  post void bladder scan showed 151 ml urine within bladder  monitor uo and bladder scan q4-6h in the mean time Resolved

## 2023-04-18 VITALS
SYSTOLIC BLOOD PRESSURE: 108 MMHG | TEMPERATURE: 97 F | RESPIRATION RATE: 18 BRPM | DIASTOLIC BLOOD PRESSURE: 63 MMHG | HEART RATE: 107 BPM | OXYGEN SATURATION: 94 %

## 2023-04-18 PROCEDURE — 51798 US URINE CAPACITY MEASURE: CPT

## 2023-04-18 PROCEDURE — 80048 BASIC METABOLIC PNL TOTAL CA: CPT

## 2023-04-18 PROCEDURE — 82962 GLUCOSE BLOOD TEST: CPT

## 2023-04-18 PROCEDURE — 97161 PT EVAL LOW COMPLEX 20 MIN: CPT

## 2023-04-18 PROCEDURE — 83605 ASSAY OF LACTIC ACID: CPT

## 2023-04-18 PROCEDURE — 83935 ASSAY OF URINE OSMOLALITY: CPT

## 2023-04-18 PROCEDURE — 97110 THERAPEUTIC EXERCISES: CPT

## 2023-04-18 PROCEDURE — 76770 US EXAM ABDO BACK WALL COMP: CPT

## 2023-04-18 PROCEDURE — 36415 COLL VENOUS BLD VENIPUNCTURE: CPT

## 2023-04-18 PROCEDURE — 84133 ASSAY OF URINE POTASSIUM: CPT

## 2023-04-18 PROCEDURE — 82746 ASSAY OF FOLIC ACID SERUM: CPT

## 2023-04-18 PROCEDURE — 96372 THER/PROPH/DIAG INJ SC/IM: CPT

## 2023-04-18 PROCEDURE — 99285 EMERGENCY DEPT VISIT HI MDM: CPT

## 2023-04-18 PROCEDURE — 96375 TX/PRO/DX INJ NEW DRUG ADDON: CPT

## 2023-04-18 PROCEDURE — 85014 HEMATOCRIT: CPT

## 2023-04-18 PROCEDURE — 82330 ASSAY OF CALCIUM: CPT

## 2023-04-18 PROCEDURE — 85027 COMPLETE CBC AUTOMATED: CPT

## 2023-04-18 PROCEDURE — 72170 X-RAY EXAM OF PELVIS: CPT

## 2023-04-18 PROCEDURE — 72125 CT NECK SPINE W/O DYE: CPT | Mod: MA

## 2023-04-18 PROCEDURE — 71045 X-RAY EXAM CHEST 1 VIEW: CPT

## 2023-04-18 PROCEDURE — 84156 ASSAY OF PROTEIN URINE: CPT

## 2023-04-18 PROCEDURE — 85730 THROMBOPLASTIN TIME PARTIAL: CPT

## 2023-04-18 PROCEDURE — 85025 COMPLETE CBC W/AUTO DIFF WBC: CPT

## 2023-04-18 PROCEDURE — 96374 THER/PROPH/DIAG INJ IV PUSH: CPT

## 2023-04-18 PROCEDURE — 85018 HEMOGLOBIN: CPT

## 2023-04-18 PROCEDURE — 80053 COMPREHEN METABOLIC PANEL: CPT

## 2023-04-18 PROCEDURE — 83690 ASSAY OF LIPASE: CPT

## 2023-04-18 PROCEDURE — 87637 SARSCOV2&INF A&B&RSV AMP PRB: CPT

## 2023-04-18 PROCEDURE — 70486 CT MAXILLOFACIAL W/O DYE: CPT | Mod: MA

## 2023-04-18 PROCEDURE — 81003 URINALYSIS AUTO W/O SCOPE: CPT

## 2023-04-18 PROCEDURE — 93005 ELECTROCARDIOGRAM TRACING: CPT

## 2023-04-18 PROCEDURE — 97165 OT EVAL LOW COMPLEX 30 MIN: CPT

## 2023-04-18 PROCEDURE — 82947 ASSAY GLUCOSE BLOOD QUANT: CPT

## 2023-04-18 PROCEDURE — 84295 ASSAY OF SERUM SODIUM: CPT

## 2023-04-18 PROCEDURE — 97535 SELF CARE MNGMENT TRAINING: CPT

## 2023-04-18 PROCEDURE — 76937 US GUIDE VASCULAR ACCESS: CPT

## 2023-04-18 PROCEDURE — 82803 BLOOD GASES ANY COMBINATION: CPT

## 2023-04-18 PROCEDURE — 97112 NEUROMUSCULAR REEDUCATION: CPT

## 2023-04-18 PROCEDURE — 82010 KETONE BODYS QUAN: CPT

## 2023-04-18 PROCEDURE — 71260 CT THORAX DX C+: CPT | Mod: MA

## 2023-04-18 PROCEDURE — 82435 ASSAY OF BLOOD CHLORIDE: CPT

## 2023-04-18 PROCEDURE — 84300 ASSAY OF URINE SODIUM: CPT

## 2023-04-18 PROCEDURE — 83735 ASSAY OF MAGNESIUM: CPT

## 2023-04-18 PROCEDURE — 82550 ASSAY OF CK (CPK): CPT

## 2023-04-18 PROCEDURE — 80307 DRUG TEST PRSMV CHEM ANLYZR: CPT

## 2023-04-18 PROCEDURE — 84540 ASSAY OF URINE/UREA-N: CPT

## 2023-04-18 PROCEDURE — 70450 CT HEAD/BRAIN W/O DYE: CPT | Mod: MA

## 2023-04-18 PROCEDURE — 82570 ASSAY OF URINE CREATININE: CPT

## 2023-04-18 PROCEDURE — 83930 ASSAY OF BLOOD OSMOLALITY: CPT

## 2023-04-18 PROCEDURE — 85610 PROTHROMBIN TIME: CPT

## 2023-04-18 PROCEDURE — 99239 HOSP IP/OBS DSCHRG MGMT >30: CPT

## 2023-04-18 PROCEDURE — 82565 ASSAY OF CREATININE: CPT

## 2023-04-18 PROCEDURE — 80076 HEPATIC FUNCTION PANEL: CPT

## 2023-04-18 PROCEDURE — 74177 CT ABD & PELVIS W/CONTRAST: CPT | Mod: MA

## 2023-04-18 PROCEDURE — 97116 GAIT TRAINING THERAPY: CPT

## 2023-04-18 PROCEDURE — 84132 ASSAY OF SERUM POTASSIUM: CPT

## 2023-04-18 PROCEDURE — 84100 ASSAY OF PHOSPHORUS: CPT

## 2023-04-18 PROCEDURE — 76377 3D RENDER W/INTRP POSTPROCES: CPT

## 2023-04-18 RX ORDER — METHADONE HYDROCHLORIDE 40 MG/1
6 TABLET ORAL
Qty: 0 | Refills: 0 | DISCHARGE
Start: 2023-04-18

## 2023-04-18 RX ORDER — SODIUM CHLORIDE 9 MG/ML
500 INJECTION INTRAMUSCULAR; INTRAVENOUS; SUBCUTANEOUS ONCE
Refills: 0 | Status: COMPLETED | OUTPATIENT
Start: 2023-04-18 | End: 2023-04-18

## 2023-04-18 RX ADMIN — SODIUM CHLORIDE 500 MILLILITER(S): 9 INJECTION INTRAMUSCULAR; INTRAVENOUS; SUBCUTANEOUS at 00:32

## 2023-04-18 RX ADMIN — Medication 2 MILLIGRAM(S): at 08:45

## 2023-04-18 RX ADMIN — PANTOPRAZOLE SODIUM 40 MILLIGRAM(S): 20 TABLET, DELAYED RELEASE ORAL at 05:46

## 2023-04-18 RX ADMIN — Medication 1 MILLIGRAM(S): at 05:47

## 2023-04-18 NOTE — DISCHARGE NOTE NURSING/CASE MANAGEMENT/SOCIAL WORK - SOCIAL WORKER'S NAME
Rico Castaneda [facility] [place] [phone] Rico Castaneda [facility] [place] [phone] [place]eda [facility] [phone]

## 2023-04-18 NOTE — DISCHARGE NOTE PROVIDER - NSDCCPCAREPLAN_GEN_ALL_CORE_FT
PRINCIPAL DISCHARGE DIAGNOSIS  Diagnosis: Alcohol withdrawal  Assessment and Plan of Treatment:

## 2023-04-18 NOTE — DISCHARGE NOTE PROVIDER - NSDCMRMEDTOKEN_GEN_ALL_CORE_FT
dextroamphetamine 30 mg oral tablet: 1 tab(s) orally once a day  escitalopram 10 mg oral tablet: 1 tab(s) orally once a day  folic acid 1 mg oral tablet: 1 tab(s) orally once a day  gabapentin 800 mg oral tablet: 1 tab(s) orally 2 times a day  methadone 10 mg oral tablet: 6 tab(s) orally once a day  Multiple Vitamins oral tablet: 1 tab(s) orally once a day  thiamine 100 mg oral tablet: 1 tab(s) orally once a day

## 2023-04-18 NOTE — DISCHARGE NOTE NURSING/CASE MANAGEMENT/SOCIAL WORK - PATIENT PORTAL LINK FT
You can access the FollowMyHealth Patient Portal offered by [facility] by registering at the following website: [email or web address]. By joining [employer]’s FollowMyHealth portal, you will also be able to view your health information using other applications (apps) compatible with our system. You can access the FollowMyHealth Patient Portal offered by [facility] by registering at the following website: [email or web address]. By joining [employer]’s FollowMyHealth portal, you will also be able to view your health information using other applications (apps) compatible with our system. You can access the FollowMyHealth Patient Portal offered by [facility] by registering at the following website: [email or web address]. By joining [employer]’s FollowMyHealth portal, you will also be able to view your health information using other applications (apps) compatible with our system.
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
## 2023-04-18 NOTE — DISCHARGE NOTE PROVIDER - ATTENDING DISCHARGE PHYSICAL EXAMINATION:
GENERAL: Well appearing, in NAD  EYES: EOMI, conjunctiva and sclera clear  ENT: moist mucosa, no pharyngeal erythema  NECK: supple, no JVD  LUNG: Clear to auscultation bilaterally; No rales, rhonchi, wheezing, or rubs.   CVS: Regular rate and rhythm; No murmurs, rubs, or gallops  ABDOMEN: Soft, non tender, nondistended. +Bowel sounds.  EXTREMITIES:  no edema, no cyanosis  NEURO:  Alert & Oriented X3,  No focal deficits  PSYCH: Normal affect, normal mood  MUSCULOSKELETAL: FROM, no joint swelling  Skin: warm and dry, normal color

## 2023-04-18 NOTE — DISCHARGE NOTE NURSING/CASE MANAGEMENT/SOCIAL WORK - NSDCPEFALRISK_GEN_ALL_CORE
For information on Fall & Injury Prevention, visit: [email or web address]://www.[facility]/[email or web address]-prevention-protects-and-maintains-health-and-mobility OR  https://www.[facility]/news/fall-prevention-tips-to-avoid-injury OR  https://www.cdc.gov/steadi/patient.html For information on Fall & Injury Prevention, visit: https://www.[facility]/news/fall-prevention-protects-and-maintains-health-and-mobility OR  https://www.[facility]/news/fall-prevention-tips-to-avoid-injury OR  https://www.cdc.gov/steadi/patient.html For information on Fall & Injury Prevention, visit: https://www.[facility]/news/fall-prevention-protects-and-maintains-health-and-mobility OR  https://www.[facility]/news/fall-prevention-tips-to-avoid-injury OR  https://www.cdc.gov/steadi/patient.html

## 2023-04-18 NOTE — DISCHARGE NOTE PROVIDER - NSDCFUADDAPPT_GEN_ALL_CORE_FT
APPTS ARE READY TO BE MADE: [ x] YES    Best Family or Patient Contact (if needed): Patient    Additional Information about above appointments (if needed):    1: Primary care within 5-7 days  2: Psych within 2-4 weeks  3: Methadone clinic    Other comments or requests:

## 2023-04-18 NOTE — DISCHARGE NOTE PROVIDER - DETAILS OF MALNUTRITION DIAGNOSIS/DIAGNOSES
This patient has been assessed with a concern for Malnutrition and was treated during this hospitalization for the following Nutrition diagnosis/diagnoses:     -  04/13/2023: Moderate protein-calorie malnutrition

## 2023-05-25 ENCOUNTER — EMERGENCY (EMERGENCY)
Facility: HOSPITAL | Age: 33
LOS: 1 days | Discharge: HOME | End: 2023-05-25
Attending: EMERGENCY MEDICINE | Admitting: EMERGENCY MEDICINE
Payer: MEDICAID

## 2023-05-25 ENCOUNTER — EMERGENCY (EMERGENCY)
Facility: HOSPITAL | Age: 33
LOS: 1 days | Discharge: HOME | End: 2023-05-25
Admitting: EMERGENCY MEDICINE
Payer: MEDICAID

## 2023-05-25 VITALS
HEART RATE: 118 BPM | DIASTOLIC BLOOD PRESSURE: 71 MMHG | SYSTOLIC BLOOD PRESSURE: 107 MMHG | RESPIRATION RATE: 16 BRPM | TEMPERATURE: 99 F | OXYGEN SATURATION: 98 %

## 2023-05-25 VITALS
TEMPERATURE: 99 F | SYSTOLIC BLOOD PRESSURE: 117 MMHG | OXYGEN SATURATION: 99 % | DIASTOLIC BLOOD PRESSURE: 86 MMHG | RESPIRATION RATE: 18 BRPM | HEART RATE: 118 BPM

## 2023-05-25 VITALS
DIASTOLIC BLOOD PRESSURE: 77 MMHG | TEMPERATURE: 98 F | HEART RATE: 89 BPM | OXYGEN SATURATION: 95 % | RESPIRATION RATE: 18 BRPM | SYSTOLIC BLOOD PRESSURE: 107 MMHG

## 2023-05-25 DIAGNOSIS — F10.229 ALCOHOL DEPENDENCE WITH INTOXICATION, UNSPECIFIED: ICD-10-CM

## 2023-05-25 DIAGNOSIS — R41.82 ALTERED MENTAL STATUS, UNSPECIFIED: ICD-10-CM

## 2023-05-25 LAB
GLUCOSE BLDC GLUCOMTR-MCNC: 86 MG/DL — SIGNIFICANT CHANGE UP (ref 70–99)

## 2023-05-25 PROCEDURE — 99283 EMERGENCY DEPT VISIT LOW MDM: CPT

## 2023-05-25 NOTE — ED PROVIDER NOTE - CLINICAL SUMMARY MEDICAL DECISION MAKING FREE TEXT BOX
pt known to have taken alcohol. At this time pt has slightly a slurr in speech but otherwise walking steadily and following commands. Old chart review shows prior visit for similar.
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
## 2023-05-25 NOTE — ED PROVIDER NOTE - PATIENT PORTAL LINK FT
You can access the FollowMyHealth Patient Portal offered by [facility] by registering at the following website: [email or web address]. By joining [employer]’s FollowMyHealth portal, you will also be able to view your health information using other applications (apps) compatible with our system. You can access the FollowMyHealth Patient Portal offered by [facility] by registering at the following website: [email or web address]. By joining [employer]’s FollowMyHealth portal, you will also be able to view your health information using other applications (apps) compatible with our system. You can access the FollowMyHealth Patient Portal offered by [facility] by registering at the following website: [email or web address]. By joining [employer]’s FollowMyHealth portal, you will also be able to view your health information using other applications (apps) compatible with our system.

## 2023-05-25 NOTE — ED PROVIDER NOTE - NEUROLOGICAL, MLM
Alert and oriented, no focal deficits, no motor or sensory deficits. slurred speech, follows commands

## 2023-05-25 NOTE — ED PROVIDER NOTE - PHYSICAL EXAMINATION
CONSTITUTIONAL   General appearance: sleeping but wakes on arousal, no acute distress, alert, opens eyes on verbal and moves spontaneously  EYES   RIGHT eye: Normal Conjunctiva and Lid   LEFT eye: Normal Conjunctiva and Lid   Sclerae: NO subconjunctival hemorrhage, No scleral icterus   PUPILS: PERRL   EOM: EOMI     EAR / NOSE / THROAT   Moist mucous membranes      NECK   Supple, no meningismus, trachea midline, no masses, full ROM     CARDIOVASCULAR   Heart Auscultation: RRR, Normal S1, S2 heart sounds, No murmurs, rubs or gallops   Distal pulses palpable     LUNGS   Auscultation: breath sounds normal, good air movement, NO wheezing, NO rales or crackles, NO rhonchi or coarse BS   Respiratory effort: No respiratory distress, normal respiration effort, speaking in Full Sentences     ABDOMEN   Soft, NO tenderness, NO mass or distension, NO guarding or rebound, normal bowel sounds, negative Rawls's sign, NO tenderness RLQ, NO organomegaly, No flank tenderness, NO pulsatile mass     LYMPHATIC   NO Anterior cervical adenopathy, NO Posterior cervical adenopathy     SKIN   Normal color, NO rash, NO erythema, NO bruising, NO skin lesion     PSYCHIATRIC   Judgement and insight intact, normal mood and affect, patient at baseline MS     NEUROLOGIC    Alert and oriented x 3, Speech slightly slurred 2/2 to alcohol use, No focal deficits, normal motor and sensory, MAEx4 spontaneously

## 2023-05-25 NOTE — ED PROVIDER NOTE - PATIENT PORTAL LINK FT
You can access the FollowMyHealth Patient Portal offered by [facility] by registering at the following website: [email or web address]. By joining [employer]’s FollowMyHealth portal, you will also be able to view your health information using other applications (apps) compatible with our system. You can access the FollowMyHealth Patient Portal offered by [facility] by registering at the following website: [email or web address]. By joining [employer]’s FollowMyHealth portal, you will also be able to view your health information using other applications (apps) compatible with our system. You can access the FollowMyHealth Patient Portal offered by [facility] by registering at the following website: [email or web address]. By joining [employer]’s FollowMyHealth portal, you will also be able to view your health information using other applications (apps) compatible with our system.

## 2023-05-25 NOTE — ED ADULT NURSE NOTE - NSFALLHARMRISKINTERV_ED_ALL_ED
Communicate risk of Fall with Harm to all staff, patient, and family/Provide visual cue: red socks, yellow wristband, yellow gown, etc/Reinforce activity limits and safety measures with patient and family/Bed in lowest position, wheels locked, appropriate side rails in place/Call bell, personal items and telephone in reach/Instruct patient to call for assistance before getting out of bed/chair/stretcher/Non-slip footwear applied when patient is off stretcher/[place] to call system/Physically safe environment - no spills, clutter or unnecessary equipment/Purposeful Proactive Rounding/Room/bathroom lighting operational, light cord in reach Communicate risk of Fall with Harm to all staff, patient, and family/Provide visual cue: red socks, yellow wristband, yellow gown, etc/Reinforce activity limits and safety measures with patient and family/Bed in lowest position, wheels locked, appropriate side rails in place/Call bell, personal items and telephone in reach/Instruct patient to call for assistance before getting out of bed/chair/stretcher/Non-slip footwear applied when patient is off stretcher/[place] to call system/Physically safe environment - no spills, clutter or unnecessary equipment/Purposeful Proactive Rounding/Room/bathroom lighting operational, light cord in reach Communicate risk of Fall with Harm to all staff, patient, and family/Provide visual cue: red socks, yellow wristband, yellow gown, etc/Reinforce activity limits and safety measures with patient and family/Bed in lowest position, wheels locked, appropriate side rails in place/Call bell, personal items and telephone in reach/Instruct patient to call for assistance before getting out of bed/chair/stretcher/Non-slip footwear applied when patient is off stretcher/[place] to call system/Physically safe environment - no spills, clutter or unnecessary equipment/Purposeful Proactive Rounding/Room/bathroom lighting operational, light cord in reach

## 2023-05-25 NOTE — ED ADULT TRIAGE NOTE - OTHER COMPLAINTS
Pt is unable to currently participate in [facility]. Pt is unable to currently participate in [facility]. Pt is unable to currently participate in [facility].

## 2023-05-25 NOTE — ED PROVIDER NOTE - IV ALTEPLASE ADMIN OUTSIDE HIDDEN
[FreeTextEntry1] : Patient reporting 30mins of morning stiffness over the last three weeks.  She explains having started antihistamine along with Gabapentin in the evenings.  She notes heaviness around hips and lower back. Patient reports discomfort over the lower right side of back and hip, exacerbated by prolonged sitting. She also explains tightness in the hands.  She finds the fluctuating weather can exacerbate symptoms\par Patient finds lubrication from last year to be helpful in regards to stiffness and radiation of pain, ie: she noted increased mobility; ambulating 10-15 blocks daily. She now notes resurfacing symptoms of stiffness and subsequent pain with minimal exercise modification. .  \par She otherwise denies accompanied joint swelling, paresthesias or motor disturbances.\par  show

## 2023-05-25 NOTE — ED PROVIDER NOTE - CLINICAL SUMMARY MEDICAL DECISION MAKING FREE TEXT BOX
pt bib ems for ams 2/2 alcoholism  no trauma on exam  pt intoxicated but answered questions approp and follows commands  transfer of care to night team upon shift change  monitored in the ER with improved  ambulated well out the ER

## 2023-05-25 NOTE — ED ADULT TRIAGE NOTE - PAIN: PRESENCE, MLM
DISCHARGE SUMMARY from Nurse    The discharge information has been reviewed with the patient. The patient verbalized understanding. Discharge medications reviewed with the patient and appropriate educational materials and side effects teaching were provided. 1415  Patient dc with family. All belongings and medications given to patient. denies pain/discomfort (Rating = 0)
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
## 2023-05-25 NOTE — ED ADULT NURSE REASSESSMENT NOTE - NSFALLHARMRISKINTERV_ED_ALL_ED
Communicate risk of Fall with Harm to all staff, patient, and family/Provide visual cue: red socks, yellow wristband, yellow gown, etc/Reinforce activity limits and safety measures with patient and family/Bed in lowest position, wheels locked, appropriate side rails in place/Call bell, personal items and telephone in reach/Instruct patient to call for assistance before getting out of bed/chair/stretcher/Non-slip footwear applied when patient is off stretcher/[place] to call system/Physically safe environment - no spills, clutter or unnecessary equipment/Purposeful Proactive Rounding/Room/bathroom lighting operational, light cord in reach Communicate risk of Fall with Harm to all staff, patient, and family/Provide visual cue: red socks, yellow wristband, yellow gown, etc/Reinforce activity limits and safety measures with patient and family/Bed in lowest position, wheels locked, appropriate side rails in place/Call bell, personal items and telephone in reach/Instruct patient to call for assistance before getting out of bed/chair/stretcher/Non-slip footwear applied when patient is off stretcher/[place] to call system/Physically safe environment - no spills, clutter or unnecessary equipment/Purposeful Proactive Rounding/Room/bathroom lighting operational, light cord in reach Communicate risk of Fall with Harm to all staff, patient, and family/Provide visual cue: red socks, yellow wristband, yellow gown, etc/Reinforce activity limits and safety measures with patient and family/Bed in lowest position, wheels locked, appropriate side rails in place/Call bell, personal items and telephone in reach/Instruct patient to call for assistance before getting out of bed/chair/stretcher/Non-slip footwear applied when patient is off stretcher/[place] to call system/Physically safe environment - no spills, clutter or unnecessary equipment/Purposeful Proactive Rounding/Room/bathroom lighting operational, light cord in reach
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
## 2023-05-25 NOTE — ED ADULT NURSE NOTE - OBJECTIVE STATEMENT
BIBEMS after found sleeping on the sidewalk. Seen earlier as per MD for etoh use and ams. no visible injuries noted. safety and fall precautions in place. pending sobriety.

## 2023-05-25 NOTE — ED ADULT NURSE REASSESSMENT NOTE - NS ED NURSE REASSESS COMMENT FT1
Pt noted to have elevated hr. Dr. Stallworth made aware. PT cleared for dc. Pt ambulated out of Ed with steady gait.

## 2023-05-25 NOTE — ED ADULT NURSE NOTE - NSFALLHARMRISKINTERV_ED_ALL_ED
Assistance OOB with selected safe patient handling equipment if applicable/Assistance with ambulation/Communicate risk of Fall with Harm to all staff, patient, and family/Monitor gait and stability/Provide visual cue: red socks, yellow wristband, yellow gown, etc/Reinforce activity limits and safety measures with patient and family/Toileting schedule using arm’s reach rule for commode and bathroom/Bed in lowest position, wheels locked, appropriate side rails in place/Call bell, personal items and telephone in reach/Instruct patient to call for assistance before getting out of bed/chair/stretcher/Non-slip footwear applied when patient is off stretcher/[place] to call system/Physically safe environment - no spills, clutter or unnecessary equipment/Purposeful Proactive Rounding/Room/bathroom lighting operational, light cord in reach Assistance OOB with selected safe patient handling equipment if applicable/Assistance with ambulation/Communicate risk of Fall with Harm to all staff, patient, and family/Monitor gait and stability/Provide visual cue: red socks, yellow wristband, yellow gown, etc/Reinforce activity limits and safety measures with patient and family/Toileting schedule using arm’s reach rule for commode and bathroom/Bed in lowest position, wheels locked, appropriate side rails in place/Call bell, personal items and telephone in reach/Instruct patient to call for assistance before getting out of bed/chair/stretcher/Non-slip footwear applied when patient is off stretcher/[place] to call system/Physically safe environment - no spills, clutter or unnecessary equipment/Purposeful Proactive Rounding/Room/bathroom lighting operational, light cord in reach Assistance OOB with selected safe patient handling equipment if applicable/Assistance with ambulation/Communicate risk of Fall with Harm to all staff, patient, and family/Monitor gait and stability/Provide visual cue: red socks, yellow wristband, yellow gown, etc/Reinforce activity limits and safety measures with patient and family/Toileting schedule using arm’s reach rule for commode and bathroom/Bed in lowest position, wheels locked, appropriate side rails in place/Call bell, personal items and telephone in reach/Instruct patient to call for assistance before getting out of bed/chair/stretcher/Non-slip footwear applied when patient is off stretcher/[place] to call system/Physically safe environment - no spills, clutter or unnecessary equipment/Purposeful Proactive Rounding/Room/bathroom lighting operational, light cord in reach

## 2023-05-25 NOTE — ED ADULT NURSE NOTE - OTHER COMPLAINTS
Pt is unable to currently participate in [facility]. Pt is unable to currently participate in [facility]. Pt is unable to currently participate in [facility].

## 2023-05-25 NOTE — ED ADULT NURSE REASSESSMENT NOTE - NS ED NURSE REASSESS COMMENT FT1
Pt. asleep in Pt. remains fast asleep in no respiratory distress. Opens eyes when spoken to but returns back to sleep. Pending sobriety.

## 2023-05-26 NOTE — ED ADULT NURSE REASSESSMENT NOTE - NS ED NURSE REASSESS COMMENT FT1
Patient is aox4 with clear comprehensible speech and steady gait. Pt declines repeat vs assessment and appears to be in no distress upon departure from ed area.

## 2023-05-29 DIAGNOSIS — R41.82 ALTERED MENTAL STATUS, UNSPECIFIED: ICD-10-CM

## 2023-05-29 DIAGNOSIS — F10.129 ALCOHOL ABUSE WITH INTOXICATION, UNSPECIFIED: ICD-10-CM

## 2023-12-27 RX ORDER — ESCITALOPRAM OXALATE 10 MG/1
1 TABLET, FILM COATED ORAL
Refills: 0 | DISCHARGE

## 2023-12-27 RX ORDER — FOLIC ACID 0.8 MG
1 TABLET ORAL
Refills: 0 | DISCHARGE

## 2023-12-27 RX ORDER — THIAMINE MONONITRATE (VIT B1) 100 MG
1 TABLET ORAL
Refills: 0 | DISCHARGE

## 2023-12-27 RX ORDER — GABAPENTIN 400 MG/1
1 CAPSULE ORAL
Refills: 0 | DISCHARGE

## 2024-06-27 NOTE — BH CONSULTATION LIAISON PROGRESS NOTE - NSBHPTASSESSDT_PSY_A_CORE
Ortho Appointment made, Pts appointment is 7/2/24 at 10:15am at [address] in [facility], [id number]  
14-Apr-2023 17:54
15-Apr-2023 13:50
16-Apr-2023 11:33
13-Apr-2023 12:56

## 2024-07-01 NOTE — ED ADULT NURSE REASSESSMENT NOTE - NSFALLRSKASSESSDT_ED_ALL_ED
Left message for patient to return my call, please transfer to clinic if he calls back.  Vivi Hawley RT (R), ROT    
10-Apr-2023 03:12
10-Apr-2023 00:22

## 2024-12-05 NOTE — ED ADULT NURSE NOTE - AS PAIN REST
12/05/24 1016   Discharge Planning   Home or Post Acute Services Post acute facilities (Rehab/SNF/etc)   Type of Post Acute Facility Services Long term care  ([facility].  No barriers to return.  She can return when medically ready.)   Expected Discharge Disposition Inter   Does the patient need discharge transport arranged? Yes   RoundTrip coordination needed? Yes   Has discharge transport been arranged? No        0 (no pain/absence of nonverbal indicators of pain)

## 2025-01-29 ENCOUNTER — EMERGENCY (EMERGENCY)
Facility: HOSPITAL | Age: 35
LOS: 1 days | Discharge: HOME | End: 2025-01-29
Attending: EMERGENCY MEDICINE | Admitting: EMERGENCY MEDICINE
Payer: MEDICAID

## 2025-01-29 VITALS
TEMPERATURE: 98 F | WEIGHT: 134.92 LBS | DIASTOLIC BLOOD PRESSURE: 85 MMHG | OXYGEN SATURATION: 100 % | HEART RATE: 92 BPM | RESPIRATION RATE: 16 BRPM | SYSTOLIC BLOOD PRESSURE: 125 MMHG

## 2025-01-29 PROBLEM — F11.10 OPIOID ABUSE, UNCOMPLICATED: Chronic | Status: ACTIVE | Noted: 2023-04-11

## 2025-01-29 PROBLEM — F90.9 ATTENTION-DEFICIT HYPERACTIVITY DISORDER, UNSPECIFIED TYPE: Chronic | Status: ACTIVE | Noted: 2023-04-11

## 2025-01-29 PROBLEM — F43.10 POST-TRAUMATIC STRESS DISORDER, UNSPECIFIED: Chronic | Status: ACTIVE | Noted: 2023-04-11

## 2025-01-29 PROBLEM — F10.10 ALCOHOL ABUSE, UNCOMPLICATED: Chronic | Status: ACTIVE | Noted: 2023-04-11

## 2025-01-29 PROBLEM — Z78.9 OTHER SPECIFIED HEALTH STATUS: Chronic | Status: ACTIVE | Noted: 2023-04-09

## 2025-01-29 PROCEDURE — 99283 EMERGENCY DEPT VISIT LOW MDM: CPT | Mod: 25

## 2025-01-29 RX ORDER — IBUPROFEN 200 MG
600 TABLET ORAL ONCE
Refills: 0 | Status: COMPLETED | OUTPATIENT
Start: 2025-01-29 | End: 2025-01-29

## 2025-01-29 NOTE — ED ADULT NURSE NOTE - OBJECTIVE STATEMENT
34 year old male BIBA with multiple vague complaints at triage. To this RN he is requesting a sandwich and ibuprofen. Denies alcohol and opioids for 6 days.
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
## 2025-01-29 NOTE — ED ADULT TRIAGE NOTE - CHIEF COMPLAINT QUOTE
BIBA for abd pain for somewhere between 2-6 days and R. leg pain since yesterday. Reporting a little bit of vomiting and 4 episodes of diarrhea. PMH TBI, PTSD

## 2025-01-29 NOTE — ED PROVIDER NOTE - PATIENT PORTAL LINK FT
You can access the FollowMyHealth Patient Portal offered by [facility] by registering at the following website: [email or web address]. By joining [employer]’s FollowMyHealth portal, you will also be able to view your health information using other applications (apps) compatible with our system.

## 2025-01-29 NOTE — ED PROVIDER NOTE - NSFOLLOWUPINSTRUCTIONS_ED_ALL_ED_FT
PLEASE FOLLOW-UP WITH YOUR PRIMARY CARE DOCTOR IN 1-2 DAYS FOR FURTHER EVALUATION.      PLEASE TAKE ALL PAPERWORK FROM TODAY'S VISIT TO YOUR PRIMARY DOCTOR.     IF YOU DO NOT HAVE A PRIMARY CARE DOCTOR PLEASE REFER TO THE OFFICE/CLINIC INFORMATION GIVEN BELOW:    If you do not have a doctor, you can call our referral line to find a doctor that matches your insurance; the number is [phone].     You can also follow up with clinics listed below, if you do not have a doctor:  [address] [address]  To make an appointment, call [phone]    [facility]  Address: [address] [place] [address]  Appointment Center: [phone] ([phone])     PLEASE RETURN TO THE ER IMMEDIATELY OR CALL 1 ANY HIGH FEVER, CHEST PAIN, TROUBLE BREATHING, VOMITING, SEVERE PAIN, OR ANY OTHER CONCERNS.    To access your record on the patient portal [facility], please visit:  [email or web address]  If you are having difficulties setting this up, call [phone] and someone can assist you over the phone.
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
## 2025-01-31 DIAGNOSIS — T73.0XXA STARVATION, INITIAL ENCOUNTER: ICD-10-CM
